# Patient Record
Sex: FEMALE | Race: WHITE | NOT HISPANIC OR LATINO | Employment: OTHER | URBAN - METROPOLITAN AREA
[De-identification: names, ages, dates, MRNs, and addresses within clinical notes are randomized per-mention and may not be internally consistent; named-entity substitution may affect disease eponyms.]

---

## 2023-03-06 ENCOUNTER — APPOINTMENT (OUTPATIENT)
Dept: LAB | Facility: LAB | Age: 78
End: 2023-03-06
Payer: MEDICARE

## 2023-03-06 LAB
ALANINE AMINOTRANSFERASE (SGPT) (U/L) IN SER/PLAS: 15 U/L (ref 7–45)
ALBUMIN (G/DL) IN SER/PLAS: 4.5 G/DL (ref 3.4–5)
ALKALINE PHOSPHATASE (U/L) IN SER/PLAS: 44 U/L (ref 33–136)
ASPARTATE AMINOTRANSFERASE (SGOT) (U/L) IN SER/PLAS: 16 U/L (ref 9–39)
BILIRUBIN DIRECT (MG/DL) IN SER/PLAS: 0.2 MG/DL (ref 0–0.3)
BILIRUBIN TOTAL (MG/DL) IN SER/PLAS: 0.7 MG/DL (ref 0–1.2)
CREATININE (MG/DL) IN SER/PLAS: 0.78 MG/DL (ref 0.5–1.05)
GFR FEMALE: 78 ML/MIN/1.73M2
PROTEIN TOTAL: 6.9 G/DL (ref 6.4–8.2)
UREA NITROGEN (MG/DL) IN SER/PLAS: 24 MG/DL (ref 6–23)

## 2023-03-15 LAB
ALANINE AMINOTRANSFERASE (SGPT) (U/L) IN SER/PLAS: 20 U/L (ref 7–45)
ALBUMIN (G/DL) IN SER/PLAS: 4.2 G/DL (ref 3.4–5)
ALKALINE PHOSPHATASE (U/L) IN SER/PLAS: 45 U/L (ref 33–136)
ASPARTATE AMINOTRANSFERASE (SGOT) (U/L) IN SER/PLAS: 18 U/L (ref 9–39)
BILIRUBIN DIRECT (MG/DL) IN SER/PLAS: 0.2 MG/DL (ref 0–0.3)
BILIRUBIN TOTAL (MG/DL) IN SER/PLAS: 1 MG/DL (ref 0–1.2)
CALCIDIOL (25 OH VITAMIN D3) (NG/ML) IN SER/PLAS: 52 NG/ML
CALCIUM (MG/DL) IN SER/PLAS: 9.7 MG/DL (ref 8.6–10.6)
CREATININE (MG/DL) IN SER/PLAS: 0.86 MG/DL (ref 0.5–1.05)
GFR FEMALE: 69 ML/MIN/1.73M2
PROTEIN TOTAL: 6.7 G/DL (ref 6.4–8.2)
UREA NITROGEN (MG/DL) IN SER/PLAS: 23 MG/DL (ref 6–23)

## 2023-03-18 LAB — OSTEOCALCIN: 9 NG/ML (ref 8–36)

## 2023-03-20 LAB
CREATININE, URINE - PER VOLUME: 179 MG/DL
NTX, URINE (NM BCE/MM CREATININE): 21

## 2023-05-24 LAB
ALANINE AMINOTRANSFERASE (SGPT) (U/L) IN SER/PLAS: 20 U/L (ref 7–45)
ALBUMIN (G/DL) IN SER/PLAS: 3.9 G/DL (ref 3.4–5)
ALKALINE PHOSPHATASE (U/L) IN SER/PLAS: 42 U/L (ref 33–136)
ANION GAP IN SER/PLAS: 13 MMOL/L (ref 10–20)
ASPARTATE AMINOTRANSFERASE (SGOT) (U/L) IN SER/PLAS: 22 U/L (ref 9–39)
BASOPHILS (10*3/UL) IN BLOOD BY AUTOMATED COUNT: 0.02 X10E9/L (ref 0–0.1)
BASOPHILS/100 LEUKOCYTES IN BLOOD BY AUTOMATED COUNT: 0.4 % (ref 0–2)
BETA-2-MICROGLOBULIN (MG/L) IN SERUM: 2 MG/L (ref 0.7–2.2)
BILIRUBIN TOTAL (MG/DL) IN SER/PLAS: 0.7 MG/DL (ref 0–1.2)
CALCIUM (MG/DL) IN SER/PLAS: 9.4 MG/DL (ref 8.6–10.6)
CARBON DIOXIDE, TOTAL (MMOL/L) IN SER/PLAS: 29 MMOL/L (ref 21–32)
CHLORIDE (MMOL/L) IN SER/PLAS: 104 MMOL/L (ref 98–107)
CREATININE (MG/DL) IN SER/PLAS: 0.94 MG/DL (ref 0.5–1.05)
EOSINOPHILS (10*3/UL) IN BLOOD BY AUTOMATED COUNT: 0.15 X10E9/L (ref 0–0.4)
EOSINOPHILS/100 LEUKOCYTES IN BLOOD BY AUTOMATED COUNT: 3 % (ref 0–6)
ERYTHROCYTE DISTRIBUTION WIDTH (RATIO) BY AUTOMATED COUNT: 12.7 % (ref 11.5–14.5)
ERYTHROCYTE MEAN CORPUSCULAR HEMOGLOBIN CONCENTRATION (G/DL) BY AUTOMATED: 30.8 G/DL (ref 32–36)
ERYTHROCYTE MEAN CORPUSCULAR VOLUME (FL) BY AUTOMATED COUNT: 98 FL (ref 80–100)
ERYTHROCYTES (10*6/UL) IN BLOOD BY AUTOMATED COUNT: 3.91 X10E12/L (ref 4–5.2)
GFR FEMALE: 62 ML/MIN/1.73M2
GLUCOSE (MG/DL) IN SER/PLAS: 164 MG/DL (ref 74–99)
HEMATOCRIT (%) IN BLOOD BY AUTOMATED COUNT: 38.3 % (ref 36–46)
HEMOGLOBIN (G/DL) IN BLOOD: 11.8 G/DL (ref 12–16)
IMMATURE GRANULOCYTES/100 LEUKOCYTES IN BLOOD BY AUTOMATED COUNT: 0.4 % (ref 0–0.9)
LEUKOCYTES (10*3/UL) IN BLOOD BY AUTOMATED COUNT: 4.9 X10E9/L (ref 4.4–11.3)
LYMPHOCYTES (10*3/UL) IN BLOOD BY AUTOMATED COUNT: 1.31 X10E9/L (ref 0.8–3)
LYMPHOCYTES/100 LEUKOCYTES IN BLOOD BY AUTOMATED COUNT: 26.6 % (ref 13–44)
MONOCYTES (10*3/UL) IN BLOOD BY AUTOMATED COUNT: 0.53 X10E9/L (ref 0.05–0.8)
MONOCYTES/100 LEUKOCYTES IN BLOOD BY AUTOMATED COUNT: 10.8 % (ref 2–10)
NEUTROPHILS (10*3/UL) IN BLOOD BY AUTOMATED COUNT: 2.9 X10E9/L (ref 1.6–5.5)
NEUTROPHILS/100 LEUKOCYTES IN BLOOD BY AUTOMATED COUNT: 58.8 % (ref 40–80)
NRBC (PER 100 WBCS) BY AUTOMATED COUNT: 0 /100 WBC (ref 0–0)
PLATELETS (10*3/UL) IN BLOOD AUTOMATED COUNT: 241 X10E9/L (ref 150–450)
POTASSIUM (MMOL/L) IN SER/PLAS: 4.6 MMOL/L (ref 3.5–5.3)
PROTEIN TOTAL: 6.6 G/DL (ref 6.4–8.2)
SEDIMENTATION RATE, ERYTHROCYTE: 18 MM/H (ref 0–30)
SODIUM (MMOL/L) IN SER/PLAS: 141 MMOL/L (ref 136–145)
URATE (MG/DL) IN SER/PLAS: 4.4 MG/DL (ref 2.3–6.7)
UREA NITROGEN (MG/DL) IN SER/PLAS: 28 MG/DL (ref 6–23)

## 2023-05-25 LAB — VISCOSITY SERUM: 1.72 CP (ref 1.5–1.8)

## 2023-05-31 LAB
ALBUMIN ELP: 3.9 G/DL (ref 3.4–5)
ALPHA 1: 0.3 G/DL (ref 0.2–0.6)
ALPHA 2: 0.7 G/DL (ref 0.4–1.1)
BETA: 0.8 G/DL (ref 0.5–1.2)
GAMMA GLOBULIN: 0.9 G/DL (ref 0.5–1.4)
M-PROTEIN 1: 0.2 G/DL
PATH REVIEW - SERUM IMMUNOFIXATION: NORMAL
PATH REVIEW-SERUM PROTEIN ELECTROPHORESIS: NORMAL
PROTEIN ELECTROPHORESIS INTERPRETATION: ABNORMAL
PROTEIN TOTAL: 6.6 G/DL (ref 6.4–8.2)
SERUM IMMUNOFIXATION INTERPRETATION: ABNORMAL

## 2023-07-18 LAB
CHOLESTEROL (MG/DL) IN SER/PLAS: 153 MG/DL (ref 0–199)
CHOLESTEROL IN HDL (MG/DL) IN SER/PLAS: 63.5 MG/DL
CHOLESTEROL/HDL RATIO: 2.4
ESTIMATED AVERAGE GLUCOSE FOR HBA1C: 126 MG/DL
FASTING GLUCOSE (MG/DL) IN SER/PLAS: 90 MG/DL (ref 74–99)
HEMOGLOBIN A1C/HEMOGLOBIN TOTAL IN BLOOD: 6 %
LDL: 78 MG/DL (ref 0–99)
THYROTROPIN (MIU/L) IN SER/PLAS BY DETECTION LIMIT <= 0.05 MIU/L: 1.95 MIU/L (ref 0.44–3.98)
TRIGLYCERIDE (MG/DL) IN SER/PLAS: 56 MG/DL (ref 0–149)
VLDL: 11 MG/DL (ref 0–40)

## 2023-08-28 PROBLEM — D48.5 NEOPLASM OF UNCERTAIN BEHAVIOR OF SKIN OF NECK: Status: ACTIVE | Noted: 2023-08-28

## 2023-08-28 PROBLEM — K64.4 EXTERNAL HEMORRHOIDS: Status: ACTIVE | Noted: 2023-08-28

## 2023-08-28 PROBLEM — D64.9 ANEMIA: Status: ACTIVE | Noted: 2023-08-28

## 2023-08-28 PROBLEM — G62.9 SMALL FIBER NEUROPATHY: Status: ACTIVE | Noted: 2023-08-28

## 2023-08-28 PROBLEM — M54.50 LOW BACK PAIN: Status: ACTIVE | Noted: 2023-08-28

## 2023-08-28 PROBLEM — M16.9 OSTEOARTHRITIS OF HIP: Status: ACTIVE | Noted: 2023-08-28

## 2023-08-28 PROBLEM — H25.10 NUCLEAR SENILE CATARACT: Status: ACTIVE | Noted: 2023-08-28

## 2023-08-28 PROBLEM — H90.3 BILATERAL SENSORINEURAL HEARING LOSS: Status: ACTIVE | Noted: 2023-08-28

## 2023-08-28 PROBLEM — L08.9 INFECTION, SKIN: Status: ACTIVE | Noted: 2023-08-28

## 2023-08-28 PROBLEM — R42 DIZZINESS: Status: ACTIVE | Noted: 2023-08-28

## 2023-08-28 PROBLEM — E55.9 VITAMIN D DEFICIENCY: Status: ACTIVE | Noted: 2023-08-28

## 2023-08-28 PROBLEM — M47.12 CERVICAL SPONDYLOSIS WITH MYELOPATHY: Status: ACTIVE | Noted: 2023-08-28

## 2023-08-28 PROBLEM — H93.291 ABNORMAL AUDITORY PERCEPTION OF RIGHT EAR: Status: ACTIVE | Noted: 2023-08-28

## 2023-08-28 PROBLEM — M47.816 DJD (DEGENERATIVE JOINT DISEASE), LUMBAR: Status: ACTIVE | Noted: 2023-08-28

## 2023-08-28 PROBLEM — R73.9 HYPERGLYCEMIA: Status: ACTIVE | Noted: 2023-08-28

## 2023-08-28 PROBLEM — R25.2 LEG CRAMPS: Status: ACTIVE | Noted: 2023-08-28

## 2023-08-28 PROBLEM — R53.83 FATIGUE: Status: ACTIVE | Noted: 2023-08-28

## 2023-08-28 PROBLEM — H61.23 BILATERAL IMPACTED CERUMEN: Status: ACTIVE | Noted: 2023-08-28

## 2023-08-28 PROBLEM — M47.12 CERVICAL ARTHRITIS WITH MYELOPATHY: Status: ACTIVE | Noted: 2023-08-28

## 2023-08-28 PROBLEM — G47.00 INSOMNIA: Status: ACTIVE | Noted: 2023-08-28

## 2023-08-28 PROBLEM — R13.10 DYSPHAGIA: Status: ACTIVE | Noted: 2023-08-28

## 2023-08-28 PROBLEM — Z78.9 KNOWN MEDICAL PROBLEMS: Status: ACTIVE | Noted: 2023-08-28

## 2023-08-28 PROBLEM — M81.0 OSTEOPOROSIS: Status: ACTIVE | Noted: 2023-08-28

## 2023-08-28 PROBLEM — R20.0 NUMBNESS OF FEET: Status: ACTIVE | Noted: 2023-08-28

## 2023-08-28 PROBLEM — K59.09 CHRONIC CONSTIPATION: Status: ACTIVE | Noted: 2023-08-28

## 2023-08-28 PROBLEM — H61.21 IMPACTED CERUMEN OF RIGHT EAR: Status: ACTIVE | Noted: 2023-08-28

## 2023-08-28 PROBLEM — H04.129 TEAR FILM INSUFFICIENCY: Status: ACTIVE | Noted: 2023-08-28

## 2023-08-28 PROBLEM — E78.5 DYSLIPIDEMIA: Status: ACTIVE | Noted: 2023-08-28

## 2023-08-28 PROBLEM — D47.2 IGM LAMBDA MONOCLONAL GAMMOPATHY: Status: ACTIVE | Noted: 2023-08-28

## 2023-08-28 PROBLEM — M54.2 CERVICAL PAIN: Status: ACTIVE | Noted: 2023-08-28

## 2023-08-28 RX ORDER — SIMVASTATIN 10 MG/1
1 TABLET, FILM COATED ORAL NIGHTLY
COMMUNITY
Start: 2019-07-03 | End: 2024-01-02

## 2023-08-28 RX ORDER — AMOXICILLIN AND CLAVULANATE POTASSIUM 875; 125 MG/1; MG/1
TABLET, FILM COATED ORAL
COMMUNITY
End: 2024-01-11 | Stop reason: ALTCHOICE

## 2023-08-28 RX ORDER — DIPHENHYDRAMINE HCL 25 MG
1 CAPSULE ORAL NIGHTLY
COMMUNITY
End: 2024-01-11 | Stop reason: ALTCHOICE

## 2023-08-28 RX ORDER — DENOSUMAB 60 MG/ML
60 INJECTION SUBCUTANEOUS
COMMUNITY

## 2023-08-28 RX ORDER — ASPIRIN 325 MG
1 TABLET, DELAYED RELEASE (ENTERIC COATED) ORAL
COMMUNITY
Start: 2019-07-17 | End: 2024-01-11 | Stop reason: SDUPTHER

## 2023-10-05 ENCOUNTER — OFFICE VISIT (OUTPATIENT)
Dept: ENDOCRINOLOGY | Facility: CLINIC | Age: 78
End: 2023-10-05
Payer: MEDICARE

## 2023-10-05 VITALS — WEIGHT: 127 LBS | BODY MASS INDEX: 19.89 KG/M2 | DIASTOLIC BLOOD PRESSURE: 70 MMHG | SYSTOLIC BLOOD PRESSURE: 116 MMHG

## 2023-10-05 DIAGNOSIS — M81.0 OSTEOPOROSIS, UNSPECIFIED OSTEOPOROSIS TYPE, UNSPECIFIED PATHOLOGICAL FRACTURE PRESENCE: ICD-10-CM

## 2023-10-05 DIAGNOSIS — E04.1 SOLITARY THYROID NODULE: Primary | ICD-10-CM

## 2023-10-05 DIAGNOSIS — R73.03 PREDIABETES: ICD-10-CM

## 2023-10-05 DIAGNOSIS — E55.9 VITAMIN D DEFICIENCY: ICD-10-CM

## 2023-10-05 PROCEDURE — 1159F MED LIST DOCD IN RCRD: CPT | Performed by: INTERNAL MEDICINE

## 2023-10-05 PROCEDURE — 99205 OFFICE O/P NEW HI 60 MIN: CPT | Performed by: INTERNAL MEDICINE

## 2023-10-05 NOTE — PROGRESS NOTES
Patient ID: Donna Cadet is a 78 y.o. female who presents for New Patient Visit (Endocrine consult. Referred by Jesse for her thyroid nodules.) and Thyroid Problem.  HPI  The patient is referred for evaluation of thyroid nodule.    This is a 78-year-old female who during her physical exam was felt to have a goiter.    She underwent thyroid ultrasound on July 28 which revealed a right lobe 4.3 x 1.1 x 1.5 cm, left lobe 3.8 x 1.0 x 1.2 cm and is PSMF that was 0.2 cm.    Within the right lobe there was a 1.2 cm solid mildly hypoechoic nodule described by radiology as compatible with a TI-RADS 4 nodule.    She has had normal thyroid function.    She does complain of chronic constipation fatigue and myalgias as well as some anxiety but no other symptoms of hyper or hypothyroidism.    She did have a sister have thyroid nodule removed 50 years ago.    She has never had head or neck irradiation and has had no obstructive symptoms.    She has a past history of prediabetes vitamin D deficiency osteoporosis hyperlipidemia C-spine surgery hyper parathyroidectomy in 2008.    Social history  retired non-smoker drinks alcohol on occasion.    Family history positive for diabetes in her father.    ROS  Comprehensive review of systems is negative.    Objective   Physical Exam  Height 5 foot 7 weight 127 BMI 19.9    Alert and oriented x3  In no distress  No focal neurologic deficits  No supraclavicular, or dorsal fat  No purple striae  Integument intact  Eyes normal  ENT normal. No adenopathy  Thyroid palpable and normal. No nodules  Chest clear to auscultation  Heart sounds are normal  Abdomen nontender. Bowel sounds normal. No organomegaly  Feet are okay  Reflexes normal with normal return    Assessment/Plan     1.  Solitary TI-RADS four 1.2 cm nodule  2.  Prediabetes  3.  Osteoporosis  4.  Vitamin D deficiency  5.  Post parathyroidectomy    We reviewed her blood and ultrasound.    We discussed that her thyroid  function is normal.    We discussed the nonspecificity of her thyroid symptoms.    We discussed American thyroid Association guidelines with regards to thyroid nodules.    Based on a TI-RADS 4 nodule greater than 1 cm but less than 1.5 cm advice is to follow with ultrasounds at 1, 2, 3 and 5 years.    We will set her up for an ultrasound to be done here in the office in 1 year.    She is advised to watch for changes within her thyroid and notify me sooner.    She will follow-up with me in 1 year sooner as needed.    I spent 60 minutes with this patient.  Greater than 50% of this time was spent in counseling and/or coordination of care.

## 2023-10-27 ENCOUNTER — TELEPHONE (OUTPATIENT)
Dept: PRIMARY CARE | Facility: CLINIC | Age: 78
End: 2023-10-27
Payer: MEDICARE

## 2023-10-30 DIAGNOSIS — G95.9 CERVICAL MYELOPATHY (MULTI): ICD-10-CM

## 2023-10-31 ENCOUNTER — TELEPHONE (OUTPATIENT)
Dept: PRIMARY CARE | Facility: CLINIC | Age: 78
End: 2023-10-31
Payer: MEDICARE

## 2023-12-01 ENCOUNTER — ANESTHESIA EVENT (OUTPATIENT)
Dept: RADIOLOGY | Facility: HOSPITAL | Age: 78
End: 2023-12-01
Payer: MEDICARE

## 2023-12-01 ENCOUNTER — ANESTHESIA (OUTPATIENT)
Dept: RADIOLOGY | Facility: HOSPITAL | Age: 78
End: 2023-12-01
Payer: MEDICARE

## 2023-12-01 ENCOUNTER — HOSPITAL ENCOUNTER (OUTPATIENT)
Dept: RADIOLOGY | Facility: HOSPITAL | Age: 78
Discharge: HOME | End: 2023-12-01
Payer: MEDICARE

## 2023-12-01 VITALS
OXYGEN SATURATION: 99 % | RESPIRATION RATE: 17 BRPM | TEMPERATURE: 96.8 F | SYSTOLIC BLOOD PRESSURE: 156 MMHG | DIASTOLIC BLOOD PRESSURE: 71 MMHG | HEART RATE: 56 BPM

## 2023-12-01 DIAGNOSIS — G95.9 CERVICAL MYELOPATHY (MULTI): ICD-10-CM

## 2023-12-01 PROCEDURE — A72141 CHG MRI, CERV SPINE

## 2023-12-01 PROCEDURE — 99100 ANES PT EXTEME AGE<1 YR&>70: CPT | Performed by: STUDENT IN AN ORGANIZED HEALTH CARE EDUCATION/TRAINING PROGRAM

## 2023-12-01 PROCEDURE — 2500000004 HC RX 250 GENERAL PHARMACY W/ HCPCS (ALT 636 FOR OP/ED)

## 2023-12-01 PROCEDURE — 72141 MRI NECK SPINE W/O DYE: CPT

## 2023-12-01 PROCEDURE — 2500000005 HC RX 250 GENERAL PHARMACY W/O HCPCS: Performed by: STUDENT IN AN ORGANIZED HEALTH CARE EDUCATION/TRAINING PROGRAM

## 2023-12-01 PROCEDURE — 3700000002 HC GENERAL ANESTHESIA TIME - EACH INCREMENTAL 1 MINUTE

## 2023-12-01 PROCEDURE — A72141 CHG MRI, CERV SPINE: Performed by: STUDENT IN AN ORGANIZED HEALTH CARE EDUCATION/TRAINING PROGRAM

## 2023-12-01 PROCEDURE — 72141 MRI NECK SPINE W/O DYE: CPT | Performed by: RADIOLOGY

## 2023-12-01 PROCEDURE — 3700000001 HC GENERAL ANESTHESIA TIME - INITIAL BASE CHARGE

## 2023-12-01 PROCEDURE — 2500000004 HC RX 250 GENERAL PHARMACY W/ HCPCS (ALT 636 FOR OP/ED): Performed by: STUDENT IN AN ORGANIZED HEALTH CARE EDUCATION/TRAINING PROGRAM

## 2023-12-01 RX ORDER — ONDANSETRON HYDROCHLORIDE 2 MG/ML
4 INJECTION, SOLUTION INTRAVENOUS ONCE AS NEEDED
OUTPATIENT
Start: 2023-12-01

## 2023-12-01 RX ORDER — HYDROMORPHONE HYDROCHLORIDE 1 MG/ML
0.2 INJECTION, SOLUTION INTRAMUSCULAR; INTRAVENOUS; SUBCUTANEOUS EVERY 5 MIN PRN
OUTPATIENT
Start: 2023-12-01

## 2023-12-01 RX ORDER — HYDROMORPHONE HYDROCHLORIDE 1 MG/ML
0.5 INJECTION, SOLUTION INTRAMUSCULAR; INTRAVENOUS; SUBCUTANEOUS EVERY 5 MIN PRN
OUTPATIENT
Start: 2023-12-01

## 2023-12-01 RX ORDER — ALBUTEROL SULFATE 0.83 MG/ML
2.5 SOLUTION RESPIRATORY (INHALATION) ONCE AS NEEDED
OUTPATIENT
Start: 2023-12-01

## 2023-12-01 RX ORDER — OXYCODONE HYDROCHLORIDE 10 MG/1
10 TABLET ORAL EVERY 4 HOURS PRN
OUTPATIENT
Start: 2023-12-01

## 2023-12-01 RX ORDER — FENTANYL CITRATE 50 UG/ML
INJECTION, SOLUTION INTRAMUSCULAR; INTRAVENOUS AS NEEDED
Status: DISCONTINUED | OUTPATIENT
Start: 2023-12-01 | End: 2023-12-01

## 2023-12-01 RX ORDER — MIDAZOLAM HYDROCHLORIDE 1 MG/ML
INJECTION, SOLUTION INTRAMUSCULAR; INTRAVENOUS AS NEEDED
Status: DISCONTINUED | OUTPATIENT
Start: 2023-12-01 | End: 2023-12-01

## 2023-12-01 RX ORDER — ACETAMINOPHEN 325 MG/1
650 TABLET ORAL EVERY 4 HOURS PRN
OUTPATIENT
Start: 2023-12-01

## 2023-12-01 RX ORDER — ROCURONIUM BROMIDE 10 MG/ML
INJECTION, SOLUTION INTRAVENOUS AS NEEDED
Status: DISCONTINUED | OUTPATIENT
Start: 2023-12-01 | End: 2023-12-01

## 2023-12-01 RX ORDER — OXYCODONE HYDROCHLORIDE 5 MG/1
5 TABLET ORAL EVERY 4 HOURS PRN
OUTPATIENT
Start: 2023-12-01

## 2023-12-01 RX ORDER — PROPOFOL 10 MG/ML
INJECTION, EMULSION INTRAVENOUS CONTINUOUS PRN
Status: DISCONTINUED | OUTPATIENT
Start: 2023-12-01 | End: 2023-12-01

## 2023-12-01 RX ORDER — PHENYLEPHRINE 10 MG/250 ML(40 MCG/ML)IN 0.9 % SOD.CHLORIDE INTRAVENOUS
CONTINUOUS PRN
Status: DISCONTINUED | OUTPATIENT
Start: 2023-12-01 | End: 2023-12-01

## 2023-12-01 RX ORDER — SODIUM CHLORIDE, SODIUM LACTATE, POTASSIUM CHLORIDE, CALCIUM CHLORIDE 600; 310; 30; 20 MG/100ML; MG/100ML; MG/100ML; MG/100ML
100 INJECTION, SOLUTION INTRAVENOUS CONTINUOUS
OUTPATIENT
Start: 2023-12-01

## 2023-12-01 RX ORDER — PROPOFOL 10 MG/ML
INJECTION, EMULSION INTRAVENOUS AS NEEDED
Status: DISCONTINUED | OUTPATIENT
Start: 2023-12-01 | End: 2023-12-01

## 2023-12-01 RX ORDER — LIDOCAINE HYDROCHLORIDE 20 MG/ML
INJECTION, SOLUTION INFILTRATION; PERINEURAL AS NEEDED
Status: DISCONTINUED | OUTPATIENT
Start: 2023-12-01 | End: 2023-12-01

## 2023-12-01 RX ORDER — PHENYLEPHRINE HCL IN 0.9% NACL 0.4MG/10ML
SYRINGE (ML) INTRAVENOUS AS NEEDED
Status: DISCONTINUED | OUTPATIENT
Start: 2023-12-01 | End: 2023-12-01

## 2023-12-01 RX ADMIN — ROCURONIUM BROMIDE 30 MG: 10 INJECTION, SOLUTION INTRAVENOUS at 11:10

## 2023-12-01 RX ADMIN — PROPOFOL 40 MG: 10 INJECTION, EMULSION INTRAVENOUS at 11:15

## 2023-12-01 RX ADMIN — PROPOFOL 100 MCG/KG/MIN: 10 INJECTION, EMULSION INTRAVENOUS at 11:27

## 2023-12-01 RX ADMIN — Medication 160 MCG: at 11:48

## 2023-12-01 RX ADMIN — SUGAMMADEX 200 MG: 100 INJECTION, SOLUTION INTRAVENOUS at 11:58

## 2023-12-01 RX ADMIN — MIDAZOLAM 2 MG: 1 INJECTION INTRAMUSCULAR; INTRAVENOUS at 11:05

## 2023-12-01 RX ADMIN — Medication 200 MCG: at 11:25

## 2023-12-01 RX ADMIN — PROPOFOL 20 MG: 10 INJECTION, EMULSION INTRAVENOUS at 11:20

## 2023-12-01 RX ADMIN — Medication 200 MCG: at 11:19

## 2023-12-01 RX ADMIN — LIDOCAINE HYDROCHLORIDE 100 MG: 20 INJECTION, SOLUTION INFILTRATION; PERINEURAL at 11:10

## 2023-12-01 RX ADMIN — PROPOFOL 20 MG: 10 INJECTION, EMULSION INTRAVENOUS at 11:25

## 2023-12-01 RX ADMIN — FENTANYL CITRATE 50 MCG: 50 INJECTION, SOLUTION INTRAMUSCULAR; INTRAVENOUS at 11:10

## 2023-12-01 RX ADMIN — SODIUM CHLORIDE, SODIUM LACTATE, POTASSIUM CHLORIDE, AND CALCIUM CHLORIDE: 600; 310; 30; 20 INJECTION, SOLUTION INTRAVENOUS at 11:04

## 2023-12-01 RX ADMIN — PROPOFOL 100 MG: 10 INJECTION, EMULSION INTRAVENOUS at 11:10

## 2023-12-01 SDOH — HEALTH STABILITY: MENTAL HEALTH: CURRENT SMOKER: 0

## 2023-12-01 ASSESSMENT — PAIN SCALES - GENERAL
PAINLEVEL_OUTOF10: 0 - NO PAIN
PAINLEVEL_OUTOF10: 0 - NO PAIN
PAIN_LEVEL: 0
PAINLEVEL_OUTOF10: 0 - NO PAIN

## 2023-12-01 ASSESSMENT — PAIN - FUNCTIONAL ASSESSMENT: PAIN_FUNCTIONAL_ASSESSMENT: 0-10

## 2023-12-01 NOTE — ANESTHESIA POSTPROCEDURE EVALUATION
Patient: Donna Cadet    Procedure Summary       Date: 12/01/23 Room / Location: [facility]    Anesthesia Start: 1100 Anesthesia Stop:     Procedure: MR CERVICAL SPINE WO CONTRAST Diagnosis:       Cervical myelopathy (CMS/HCC)      (Cervical Spondylosis with Myelopathy)    Scheduled Providers:  Responsible Provider: Martell Wyatt DO    Anesthesia Type: general ASA Status: 2            Anesthesia Type: general    Vitals Value Taken Time   /58 12/01/23 1144   Temp 36 12/01/23 1144   Pulse 66 12/01/23 1144   Resp 11 12/01/23 1144   SpO2 96 12/01/23 1144       Anesthesia Post Evaluation    Patient location during evaluation: PACU  Patient participation: complete - patient participated  Level of consciousness: awake  Pain score: 0  Pain management: adequate  Multimodal analgesia pain management approach  Airway patency: patent  Two or more strategies used to mitigate risk of obstructive sleep apnea  Cardiovascular status: acceptable  Respiratory status: face mask  Hydration status: acceptable  Postoperative Nausea and Vomiting: none      There were no known notable events for this encounter.

## 2023-12-01 NOTE — ANESTHESIA PROCEDURE NOTES
Airway  Date/Time: 12/1/2023 11:13 AM  Urgency: elective    Airway not difficult    Staffing  Performed: CRNA   Authorized by: Martell Wyatt DO    Performed by: JOSE MARIA Dang-SARI  Patient location during procedure: OR    Indications and Patient Condition  Indications for airway management: anesthesia  Spontaneous ventilation: present  Sedation level: no sedation  Preoxygenated: yes  Patient position: sniffing  MILS maintained throughout  Mask difficulty assessment: 1 - vent by mask    Final Airway Details  Final airway type: endotracheal airway      Successful airway: ETT  Cuffed: yes   Successful intubation technique: direct laryngoscopy  Blade: Opal  Blade size: #3  ETT size (mm): 7.0  Cormack-Lehane Classification: grade III - view of epiglottis only  Placement verified by: chest auscultation and capnometry   Measured from: lips  ETT to lips (cm): 21  Number of attempts at approach: 1

## 2023-12-01 NOTE — ANESTHESIA PREPROCEDURE EVALUATION
Patient: Donna Cadet    Procedure Information       Date/Time: 12/01/23 1000    Procedure: MR CERVICAL SPINE WO CONTRAST    Location: [facility]        There were no vitals filed for this visit.    Past Surgical History:   Procedure Laterality Date    COLONOSCOPY  04/20/2013    Complete Colonoscopy    LUMBAR FUSION  01/05/2017    Lumbar Vertebral Fusion    OTHER SURGICAL HISTORY  04/20/2013    Parathyroid Complete Parathyroidectomy    OTHER SURGICAL HISTORY  05/10/2018    Vertebral Body Resection Cervical Segment     Past Medical History:   Diagnosis Date    Age-related osteoporosis without current pathological fracture 07/10/2013    Osteoporosis    Other conditions influencing health status     Bone Density Studies Dual-Energy X-ray Absorptiometry    Other hemorrhoids 10/03/2017    Bleeding internal hemorrhoids    Other specified disorders of temporomandibular joint 08/03/2017    TMJ inflammation    Pyuria 03/25/2014    Pyuria       Current Outpatient Medications:     amoxicillin-pot clavulanate (Augmentin) 875-125 mg tablet, Take by mouth., Disp: , Rfl:     calcium carbonate (TUMS ORAL), Take by mouth., Disp: , Rfl:     CALCIUM ORAL, Calcium, Disp: , Rfl:     cholecalciferol (Vitamin D-3) 1,250 mcg (50,000 unit) capsule, Take 1 capsule (50,000 Units) by mouth. Once a month, Disp: , Rfl:     denosumab (Prolia) 60 mg/mL syringe, Inject 1 mL (60 mg) under the skin., Disp: , Rfl:     diphenhydrAMINE (BENADryl) 25 mg capsule, Take 1 capsule (25 mg) by mouth once daily at bedtime., Disp: , Rfl:     simvastatin (Zocor) 10 mg tablet, Take 1 tablet (10 mg) by mouth once daily at bedtime., Disp: , Rfl:   Prior to Admission medications    Medication Sig Start Date End Date Taking? Authorizing Provider   amoxicillin-pot clavulanate (Augmentin) 875-125 mg tablet Take by mouth.    Historical Provider, MD   calcium carbonate (TUMS ORAL) Take by mouth.    Historical Provider, MD   CALCIUM ORAL Calcium     "Historical Provider, MD   cholecalciferol (Vitamin D-3) 1,250 mcg (50,000 unit) capsule Take 1 capsule (50,000 Units) by mouth. Once a month 7/17/19   Historical Provider, MD   denosumab (Prolia) 60 mg/mL syringe Inject 1 mL (60 mg) under the skin.    Historical Provider, MD   diphenhydrAMINE (BENADryl) 25 mg capsule Take 1 capsule (25 mg) by mouth once daily at bedtime.    Historical Provider, MD   simvastatin (Zocor) 10 mg tablet Take 1 tablet (10 mg) by mouth once daily at bedtime. 7/3/19   Historical Provider, MD     Allergies   Allergen Reactions    Denosumab Unknown     Social History     Tobacco Use    Smoking status: Not on file    Smokeless tobacco: Not on file   Substance Use Topics    Alcohol use: Not on file         Chemistry    Lab Results   Component Value Date/Time     05/24/2023 0954    K 4.6 05/24/2023 0954     05/24/2023 0954    CO2 29 05/24/2023 0954    BUN 28 (H) 05/24/2023 0954    CREATININE 0.94 05/24/2023 0954    Lab Results   Component Value Date/Time    CALCIUM 9.4 05/24/2023 0954    ALKPHOS 42 05/24/2023 0954    AST 22 05/24/2023 0954    ALT 20 05/24/2023 0954    BILITOT 0.7 05/24/2023 0954          Lab Results   Component Value Date/Time    WBC 4.9 05/24/2023 0954    HGB 11.8 (L) 05/24/2023 0954    HCT 38.3 05/24/2023 0954     05/24/2023 0954     No results found for: \"PROTIME\", \"PTT\", \"INR\"  No results found for this or any previous visit (from the past 4464 hour(s)).  No results found for this or any previous visit from the past 1095 days.       Relevant Problems   Anesthesia (within normal limits)      Endocrine  S/p parathyroidectomy      Neuro/Psych   (+) Small fiber neuropathy      Hematology   (+) Anemia      Musculoskeletal   (+) Cervical spondylosis with myelopathy   (+) DJD (degenerative joint disease), lumbar   (+) Osteoarthritis of hip      Eyes, Ears, Nose, and Throat   (+) Bilateral sensorineural hearing loss      Infectious Disease   (+) Infection, skin    "   Other   (+) Cervical arthritis with myelopathy       Clinical information reviewed:                   NPO Detail:  No data recorded     Physical Exam    Airway  Mallampati: II  TM distance: >3 FB  Neck ROM: full     Cardiovascular   Rhythm: regular  Rate: normal  (+) murmur     Dental - normal exam  Comments: Cap/crown to #8   Pulmonary - normal exam     Abdominal - normal exam  Abdomen: soft             Anesthesia Plan    ASA 2     general     The patient is not a current smoker.    intravenous induction   Anesthetic plan and risks discussed with patient and spouse.  Use of blood products discussed with patient and spouse who.    Plan discussed with CRNA and attending.

## 2023-12-01 NOTE — ANESTHESIA PROCEDURE NOTES
Peripheral IV  Date/Time: 12/1/2023 11:56 AM  Inserted by: JOSE MARIA Dang-CRNA    Placement  Needle size: 20 G  Laterality: left  Location: arm  Local anesthetic: none  Site prep: alcohol  Technique: anatomical landmarks  Attempts: 1

## 2023-12-02 ENCOUNTER — DOCUMENTATION (OUTPATIENT)
Dept: NEUROLOGY | Facility: HOSPITAL | Age: 78
End: 2023-12-02
Payer: MEDICARE

## 2023-12-02 DIAGNOSIS — M47.12 CERVICAL SPONDYLOSIS WITH MYELOPATHY: Primary | ICD-10-CM

## 2023-12-06 NOTE — PROGRESS NOTES
I reviewed the MRI of the cervical spine done with sedation on Mrs. Donna Hernandez today and called her with the results.  The MRI revealed a stable C4-C7 fusion with a new disc bulging and moderate stenosis at C3-C4 right above the fusion.  There is no abnormal cord signal.  This was compared to prior MRI done preoperatively in 2017.    I called her with the results.  She said that she is stable and has mostly numbness in the legs.  She has had no weakness or imbalance.  She has had no symptoms in the upper limbs.  She denies any bladder or bowel control difficulties.    In summary, Mrs. DONNA HERNANDEZ has recurrent sensory symptoms in the legs. This is new since she had residual numbness related to severe cervical spondylotic myelopathy which was treated surgically with fusion in February 2017. She had improved significantly following surgery and her gait has normalized was last seen in 2019. Her neurological examination in August 2023 revealed hyperreflexia and significant loss of dorsal column functions particularly vibration in both legs.     She has recurrent cervical spondylotic myelopathy due to n a disc bulging/mild stenosis at C3-C4 above the fusion. T     I discussed this today with her and her  .  I suggested that she consult with Dr. Sincere Win who had operated on her initially.  I am not sure that she is at this time a candidate for another decompressive surgery.  However, she need to watch for any weakness, imbalance or sphincteric disturbance.  She should return and see me if she has any of the symptoms.  She will call for questions.    DIAGNOSIS    Cervical spondylosis with myelopathy - Primary       Lucian Choi M.D., F.A.C.P.   Director, [facility] laboratory   [facility]   [facility]   Professor of Neurology   [facility]

## 2023-12-31 DIAGNOSIS — E78.5 HYPERLIPIDEMIA, UNSPECIFIED: ICD-10-CM

## 2024-01-02 RX ORDER — SIMVASTATIN 10 MG/1
10 TABLET, FILM COATED ORAL NIGHTLY
Qty: 90 TABLET | Refills: 1 | Status: SHIPPED | OUTPATIENT
Start: 2024-01-02 | End: 2024-01-11 | Stop reason: SDUPTHER

## 2024-01-08 ENCOUNTER — OFFICE VISIT (OUTPATIENT)
Dept: ORTHOPEDIC SURGERY | Facility: CLINIC | Age: 79
End: 2024-01-08
Payer: MEDICARE

## 2024-01-08 DIAGNOSIS — R20.0 LEG NUMBNESS: Primary | ICD-10-CM

## 2024-01-08 PROCEDURE — 99203 OFFICE O/P NEW LOW 30 MIN: CPT | Performed by: ORTHOPAEDIC SURGERY

## 2024-01-08 PROCEDURE — 1126F AMNT PAIN NOTED NONE PRSNT: CPT | Performed by: ORTHOPAEDIC SURGERY

## 2024-01-08 NOTE — PROGRESS NOTES
Donna And her ,  Scott Cadet returned.  She had a prior multilevel anterior cervical decompression and fusion for severe spondylosis and myelopathy.    Over the last several months she has developed numbness in both calfs and feet.  It is intermittent in severity and duration.  No weakness.    No cervical symptoms of radiculopathy or coordination or weakness.    No treatment at this point.    She did see Dr. Choi who sent her off for a cervical MRI.    Family, social, and medical histories are obtained and reviewed.    30-point, patient-recorded Review of Systems is personally obtained and reviewed. Inclusive is no history of weight loss, change in appetite, recent change in activity level, change in bowel or bladder habits, fevers, chills, malaise, or night pain.    Healthy-appearing patient no acute distress.  Stable gait. Tandem ambulation without difficulty. Painless motion cervical spine. Negative Lhermitte's. Strength is intact both upper and lower extremities. Sensation intact. No hyporeflexia upper or lower extremities.    Her cervical MRI does show a mild disc bulge at C3-4, above her prior fusion.  There is mild canal stenosis but no severe spinal cord compression.    We reviewed her MRI and discussed her situation at length.  In the absence of any significant myelopathic issues, I would reassure her.  We talked about potential empiric treatment with medication but her symptoms are not as severe that she would like to consider this.    She will observe things and she will keep us updated on her progress.    ** Dictated with voice recognition software and not immediately reviewed for errors in grammar and/or spelling **

## 2024-01-08 NOTE — LETTER
January 8, 2024     Karla Justice MD  [facility], [address]  [address]   [place] [address]    Patient: Donna Cadet   YOB: 1945   Date of Visit: 1/8/2024       Dear Dr. Karla Justice MD:    Thank you for referring Donna Cadet to me for evaluation. Below are my notes for this consultation.  If you have questions, please do not hesitate to call me. I look forward to following your patient along with you.       Sincerely,     Sincere Win MD      CC: No Recipients  ______________________________________________________________________________________    Donna And her , Dr. Scott Mendezmarilia returned.  She had a prior multilevel anterior cervical decompression and fusion for severe spondylosis and myelopathy.    Over the last several months she has developed numbness in both calfs and feet.  It is intermittent in severity and duration.  No weakness.    No cervical symptoms of radiculopathy or coordination or weakness.    No treatment at this point.    She did see Dr. Choi who sent her off for a cervical MRI.    Family, social, and medical histories are obtained and reviewed.    30-point, patient-recorded Review of Systems is personally obtained and reviewed. Inclusive is no history of weight loss, change in appetite, recent change in activity level, change in bowel or bladder habits, fevers, chills, malaise, or night pain.    Healthy-appearing patient no acute distress.  Stable gait. Tandem ambulation without difficulty. Painless motion cervical spine. Negative Lhermitte's. Strength is intact both upper and lower extremities. Sensation intact. No hyporeflexia upper or lower extremities.    Her cervical MRI does show a mild disc bulge at C3-4, above her prior fusion.  There is mild canal stenosis but no severe spinal cord compression.    We reviewed her MRI and discussed her situation at length.  In the absence of any significant myelopathic  issues, I would reassure her.  We talked about potential empiric treatment with medication but her symptoms are not as severe that she would like to consider this.    She will observe things and she will keep us updated on her progress.    ** Dictated with voice recognition software and not immediately reviewed for errors in grammar and/or spelling **

## 2024-01-11 ENCOUNTER — OFFICE VISIT (OUTPATIENT)
Dept: PRIMARY CARE | Facility: CLINIC | Age: 79
End: 2024-01-11
Payer: MEDICARE

## 2024-01-11 VITALS
RESPIRATION RATE: 16 BRPM | WEIGHT: 126.6 LBS | BODY MASS INDEX: 19.87 KG/M2 | DIASTOLIC BLOOD PRESSURE: 60 MMHG | HEART RATE: 69 BPM | SYSTOLIC BLOOD PRESSURE: 94 MMHG | OXYGEN SATURATION: 98 % | HEIGHT: 67 IN | TEMPERATURE: 98.1 F

## 2024-01-11 DIAGNOSIS — E78.5 HYPERLIPIDEMIA, UNSPECIFIED: ICD-10-CM

## 2024-01-11 DIAGNOSIS — G47.00 INSOMNIA, UNSPECIFIED TYPE: Primary | ICD-10-CM

## 2024-01-11 DIAGNOSIS — E55.9 VITAMIN D DEFICIENCY: ICD-10-CM

## 2024-01-11 DIAGNOSIS — D47.2 IGM LAMBDA MONOCLONAL GAMMOPATHY: ICD-10-CM

## 2024-01-11 PROCEDURE — 1126F AMNT PAIN NOTED NONE PRSNT: CPT | Performed by: INTERNAL MEDICINE

## 2024-01-11 PROCEDURE — 99214 OFFICE O/P EST MOD 30 MIN: CPT | Performed by: INTERNAL MEDICINE

## 2024-01-11 RX ORDER — ASPIRIN 325 MG
TABLET, DELAYED RELEASE (ENTERIC COATED) ORAL
Qty: 12 CAPSULE | Refills: 1 | Status: SHIPPED | OUTPATIENT
Start: 2024-01-11

## 2024-01-11 RX ORDER — SIMVASTATIN 10 MG/1
10 TABLET, FILM COATED ORAL NIGHTLY
Qty: 90 TABLET | Refills: 1 | Status: SHIPPED | OUTPATIENT
Start: 2024-01-11 | End: 2024-01-22 | Stop reason: SDUPTHER

## 2024-01-11 ASSESSMENT — ENCOUNTER SYMPTOMS
CONSTIPATION: 0
MYALGIAS: 0
DEPRESSION: 0
LOSS OF SENSATION IN FEET: 0
OCCASIONAL FEELINGS OF UNSTEADINESS: 0
BACK PAIN: 0
SHORTNESS OF BREATH: 0

## 2024-01-11 ASSESSMENT — PATIENT HEALTH QUESTIONNAIRE - PHQ9
1. LITTLE INTEREST OR PLEASURE IN DOING THINGS: NOT AT ALL
2. FEELING DOWN, DEPRESSED OR HOPELESS: NOT AT ALL
SUM OF ALL RESPONSES TO PHQ9 QUESTIONS 1 AND 2: 0

## 2024-01-11 ASSESSMENT — COLUMBIA-SUICIDE SEVERITY RATING SCALE - C-SSRS
1. IN THE PAST MONTH, HAVE YOU WISHED YOU WERE DEAD OR WISHED YOU COULD GO TO SLEEP AND NOT WAKE UP?: NO
6. HAVE YOU EVER DONE ANYTHING, STARTED TO DO ANYTHING, OR PREPARED TO DO ANYTHING TO END YOUR LIFE?: NO
2. HAVE YOU ACTUALLY HAD ANY THOUGHTS OF KILLING YOURSELF?: NO

## 2024-01-11 NOTE — PROGRESS NOTES
"Subjective   Patient ID: Donna Cadet is a 78 y.o. female who presents for Hyperlipidemia.    HPI she continues walking 3 to 4  x week,indoors about 1.5 to miles /each.  She has good compliance with her meds  She continues Prolia every 6 m with Rheumatologist, last dexa 2 y ago.  She saw neurologist due to bilateral distal legs numbness, neurology completed MRI cervical that confirmed herniation on c3c4 , spinal canal .  Then consulted neurosurgeon Dr Win. No need for intervention or meds.  Review consult delio quinn about thryoid nodule, she will follow up annualy  Her hematology visit will be in summer   Her sleep is hard to conceive or and maintain sleep, melatonine was not effective, takes benadryl in the middle in the night about 3 x week, feels fresh next day.    Review of Systems   Respiratory:  Negative for shortness of breath.    Cardiovascular:  Negative for chest pain.   Gastrointestinal:  Negative for constipation.        Constipation with straining some times, uses prunes, miralax but only every other week.   Musculoskeletal:  Negative for back pain and myalgias.       Objective   BP 94/60 (BP Location: Left arm, Patient Position: Sitting, BP Cuff Size: Adult)   Pulse 69   Temp 36.7 °C (98.1 °F)   Resp 16   Ht 1.702 m (5' 7\")   Wt 57.4 kg (126 lb 9.6 oz)   SpO2 98%   BMI 19.83 kg/m²     Physical Exam  Eyes - conjunctivae clear, PERRLA  HEENT - no impacted wax  Neck - no cervical lymphadenopathy,  thyromegaly  Axilla - no palpable lymphadenopathy  Cardiac- regular rate and rhythm, no murmurs, no carotid bruit, no JVP  Lung - clear to auscultation, no rales, no rhonchi, no wheezing  GI - normally active bowel sounds, non tender, non distended, no hepatosplenomegaly, no rebound  MSK - non deformities, no lateral hip pain  Extremities - no edema, good distal pulses  Neuro - non focal, oriented x 3  Skin - no bruises, no rashes  Psychiatric - pleasant, well groom, no hallucinations    Assessment/Plan "   Problem List Items Addressed This Visit             ICD-10-CM       Cardiac and Vasculature    Hyperlipidemia, unspecified E78.5     On goal, continue same dose         Relevant Medications    simvastatin (Zocor) 10 mg tablet       Endocrine/Metabolic    Vitamin D deficiency E55.9    Relevant Medications    cholecalciferol (Vitamin D-3) 50,000 unit capsule       Hematology and Neoplasia    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            Sleep    Insomnia - Primary G47.00     Prefers to avoid rx, she will continue prn otc

## 2024-01-11 NOTE — PATIENT INSTRUCTIONS
Please increase weight bearing exercise, continue current medications, increase frequency of miralax, maintain hydration,

## 2024-01-19 ENCOUNTER — APPOINTMENT (OUTPATIENT)
Dept: OPHTHALMOLOGY | Facility: CLINIC | Age: 79
End: 2024-01-19
Payer: MEDICARE

## 2024-01-22 DIAGNOSIS — E78.5 HYPERLIPIDEMIA, UNSPECIFIED: ICD-10-CM

## 2024-01-26 RX ORDER — SIMVASTATIN 10 MG/1
10 TABLET, FILM COATED ORAL NIGHTLY
Qty: 14 TABLET | Refills: 0 | Status: SHIPPED | OUTPATIENT
Start: 2024-01-26

## 2024-01-30 DIAGNOSIS — E78.5 HYPERLIPIDEMIA, UNSPECIFIED: ICD-10-CM

## 2024-03-07 ENCOUNTER — OFFICE VISIT (OUTPATIENT)
Dept: OPHTHALMOLOGY | Facility: CLINIC | Age: 79
End: 2024-03-07
Payer: MEDICARE

## 2024-03-07 DIAGNOSIS — H52.7 UNSPECIFIED DISORDER OF REFRACTION: ICD-10-CM

## 2024-03-07 DIAGNOSIS — H25.13 AGE-RELATED NUCLEAR CATARACT OF BOTH EYES: Primary | ICD-10-CM

## 2024-03-07 DIAGNOSIS — H04.123 INSUFFICIENCY OF TEAR FILM OF BOTH EYES: ICD-10-CM

## 2024-03-07 PROCEDURE — 99213 OFFICE O/P EST LOW 20 MIN: CPT | Performed by: OPHTHALMOLOGY

## 2024-03-07 PROCEDURE — 92015 DETERMINE REFRACTIVE STATE: CPT | Performed by: OPHTHALMOLOGY

## 2024-03-07 PROCEDURE — 92015 DETERMINE REFRACTIVE STATE: CPT | Mod: MUE | Performed by: OPHTHALMOLOGY

## 2024-03-07 RX ORDER — POLYETHYLENE GLYCOL 3350 17 G/17G
17 POWDER, FOR SOLUTION ORAL DAILY
COMMUNITY
Start: 2019-07-17

## 2024-03-07 ASSESSMENT — REFRACTION_WEARINGRX
OS_SPHERE: +1.25
OD_ADD: +2.75
OS_ADD: +2.75
SPECS_TYPE: BIFOCAL
OS_AXIS: 090
OS_CYLINDER: -1.00
OS_CYLINDER: -0.75
OD_AXIS: 070
OD_SPHERE: +2.50
OD_AXIS: 082
OD_CYLINDER: -0.75
OD_AXIS: 084
OS_CYLINDER: -1.00
OS_AXIS: 087
OD_CYLINDER: -0.75
OS_SPHERE: +1.75
OD_SPHERE: +1.25
OS_SPHERE: +2.50
OD_SPHERE: +2.00
OD_SPHERE: +1.75
OD_CYLINDER: -0.75
SPECS_TYPE: OTC READERS
OS_AXIS: 090
OS_SPHERE: +1.75
SPECS_TYPE: DISTANCE ONLY

## 2024-03-07 ASSESSMENT — TONOMETRY
OS_IOP_MMHG: 15
OD_IOP_MMHG: 15
IOP_METHOD: GOLDMANN APPLANATION

## 2024-03-07 ASSESSMENT — KERATOMETRY
METHOD_AUTO_MANUAL: AUTOMATED
OS_K1POWER_DIOPTERS: 46.25
OS_AXISANGLE2_DEGREES: 110
OS_K2POWER_DIOPTERS: 47.00
OD_AXISANGLE_DEGREES: 50
OD_K1POWER_DIOPTERS: 45.75
OD_K2POWER_DIOPTERS: 46.00
OD_AXISANGLE2_DEGREES: 140
OS_AXISANGLE_DEGREES: 20

## 2024-03-07 ASSESSMENT — EXTERNAL EXAM - RIGHT EYE: OD_EXAM: NORMAL

## 2024-03-07 ASSESSMENT — SLIT LAMP EXAM - LIDS
COMMENTS: NORMAL
COMMENTS: NORMAL

## 2024-03-07 ASSESSMENT — VISUAL ACUITY
OS_CC+: -1
CORRECTION_TYPE: GLASSES
OD_CC: 20/25
OS_CC: 20/25
METHOD: SNELLEN - SINGLE

## 2024-03-07 ASSESSMENT — CUP TO DISC RATIO
OD_RATIO: 0.1
OS_RATIO: 0.1

## 2024-03-07 ASSESSMENT — ENCOUNTER SYMPTOMS
ENDOCRINE NEGATIVE: 0
LOSS OF SENSATION IN FEET: 0
OCCASIONAL FEELINGS OF UNSTEADINESS: 0
HEMATOLOGIC/LYMPHATIC NEGATIVE: 0
PSYCHIATRIC NEGATIVE: 0
CONSTITUTIONAL NEGATIVE: 0
ALLERGIC/IMMUNOLOGIC NEGATIVE: 0
MUSCULOSKELETAL NEGATIVE: 0
CARDIOVASCULAR NEGATIVE: 0
GASTROINTESTINAL NEGATIVE: 0
RESPIRATORY NEGATIVE: 0
DEPRESSION: 0
NEUROLOGICAL NEGATIVE: 0
EYES NEGATIVE: 0

## 2024-03-07 ASSESSMENT — EXTERNAL EXAM - LEFT EYE: OS_EXAM: NORMAL

## 2024-03-07 ASSESSMENT — REFRACTION_MANIFEST
OD_AXIS: 095
OS_AXIS: 100
OS_SPHERE: +2.25
OD_SPHERE: +2.00
OS_CYLINDER: -1.00
OD_CYLINDER: -0.75
METHOD_AUTOREFRACTION: 1

## 2024-03-07 ASSESSMENT — PAIN SCALES - GENERAL: PAINLEVEL: 0-NO PAIN

## 2024-03-07 ASSESSMENT — PATIENT HEALTH QUESTIONNAIRE - PHQ9
SUM OF ALL RESPONSES TO PHQ9 QUESTIONS 1 AND 2: 0
1. LITTLE INTEREST OR PLEASURE IN DOING THINGS: NOT AT ALL
2. FEELING DOWN, DEPRESSED OR HOPELESS: NOT AT ALL

## 2024-03-07 NOTE — ASSESSMENT & PLAN NOTE
Looks dry on exam and some symptoms as well. Advised on regular lubrication and would expect could sharpen near issues with vision.

## 2024-03-07 NOTE — PROGRESS NOTES
Assessment/Plan   Problem List Items Addressed This Visit       Tear film insufficiency     Looks dry on exam and some symptoms as well. Advised on regular lubrication and would expect could sharpen near issues with vision.          Age-related nuclear cataract of both eyes - Primary     Non significant cataract noted on exam. Will plan to continue to monitor with serial exam.            Unspecified disorder of refraction     Discussed glasses prescription from refraction. Will provide if patient interested in keeping for records or to fill as a new set of glasses.               Provided reassurance regarding above diagnoses and care received in the office visit today. Discussed outcomes and options along with the importance of treatment compliance. Understands the importance of any follow up visits. Patient instructed to call/communicate with our office if any new issues, questions, or concerns.     Will plan to see back in 1 year full or sooner PRN

## 2024-03-07 NOTE — PATIENT INSTRUCTIONS
Thank you so much for choosing me to provide your care today!    If you were dilated your vision may remain blurry   or light sensitive for several hours.    The nature of eye and vision problems can require frequent follow up, please make every effort to adhere to any future appointments.    If you have any issues, questions, or concerns,   please do not hesitate to reach out.    If you receive a survey in regards to your care today, please mention any exceptional care my office staff and/or technicians provided.    You can reach our office at this number:  [phone]

## 2024-03-14 ENCOUNTER — TELEPHONE (OUTPATIENT)
Dept: RHEUMATOLOGY | Facility: CLINIC | Age: 79
End: 2024-03-14
Payer: MEDICARE

## 2024-03-14 DIAGNOSIS — M81.0 OSTEOPOROSIS WITHOUT CURRENT PATHOLOGICAL FRACTURE, UNSPECIFIED OSTEOPOROSIS TYPE: ICD-10-CM

## 2024-04-02 ENCOUNTER — TRANSCRIBE ORDERS (OUTPATIENT)
Dept: RHEUMATOLOGY | Facility: CLINIC | Age: 79
End: 2024-04-02
Payer: MEDICARE

## 2024-04-02 DIAGNOSIS — E55.9 VITAMIN D DEFICIENCY: Primary | ICD-10-CM

## 2024-04-02 DIAGNOSIS — M81.0 OSTEOPOROSIS, UNSPECIFIED OSTEOPOROSIS TYPE, UNSPECIFIED PATHOLOGICAL FRACTURE PRESENCE: ICD-10-CM

## 2024-04-12 ENCOUNTER — LAB (OUTPATIENT)
Dept: LAB | Facility: LAB | Age: 79
End: 2024-04-12
Payer: MEDICARE

## 2024-04-12 DIAGNOSIS — M81.0 OSTEOPOROSIS, UNSPECIFIED OSTEOPOROSIS TYPE, UNSPECIFIED PATHOLOGICAL FRACTURE PRESENCE: ICD-10-CM

## 2024-04-12 DIAGNOSIS — E55.9 VITAMIN D DEFICIENCY: ICD-10-CM

## 2024-04-12 LAB
25(OH)D3 SERPL-MCNC: 48 NG/ML (ref 30–100)
ALBUMIN SERPL BCP-MCNC: 4 G/DL (ref 3.4–5)
ALP SERPL-CCNC: 44 U/L (ref 33–136)
ALT SERPL W P-5'-P-CCNC: 17 U/L (ref 7–45)
ANION GAP SERPL CALC-SCNC: 11 MMOL/L (ref 10–20)
AST SERPL W P-5'-P-CCNC: 17 U/L (ref 9–39)
BILIRUB SERPL-MCNC: 0.6 MG/DL (ref 0–1.2)
BUN SERPL-MCNC: 24 MG/DL (ref 6–23)
CALCIUM SERPL-MCNC: 9.5 MG/DL (ref 8.6–10.6)
CHLORIDE SERPL-SCNC: 105 MMOL/L (ref 98–107)
CO2 SERPL-SCNC: 31 MMOL/L (ref 21–32)
CREAT SERPL-MCNC: 0.97 MG/DL (ref 0.5–1.05)
EGFRCR SERPLBLD CKD-EPI 2021: 60 ML/MIN/1.73M*2
GLUCOSE SERPL-MCNC: 100 MG/DL (ref 74–99)
MAGNESIUM SERPL-MCNC: 1.7 MG/DL (ref 1.6–2.4)
PHOSPHATE SERPL-MCNC: 4.1 MG/DL (ref 2.5–4.9)
POTASSIUM SERPL-SCNC: 4.3 MMOL/L (ref 3.5–5.3)
PROT SERPL-MCNC: 6.4 G/DL (ref 6.4–8.2)
PTH-INTACT SERPL-MCNC: 77.8 PG/ML (ref 18.5–88)
SODIUM SERPL-SCNC: 143 MMOL/L (ref 136–145)

## 2024-04-12 PROCEDURE — 80053 COMPREHEN METABOLIC PANEL: CPT

## 2024-04-12 PROCEDURE — 36415 COLL VENOUS BLD VENIPUNCTURE: CPT

## 2024-04-12 PROCEDURE — 82306 VITAMIN D 25 HYDROXY: CPT

## 2024-04-12 PROCEDURE — 83735 ASSAY OF MAGNESIUM: CPT

## 2024-04-12 PROCEDURE — 83970 ASSAY OF PARATHORMONE: CPT

## 2024-04-12 PROCEDURE — 84100 ASSAY OF PHOSPHORUS: CPT

## 2024-05-02 ENCOUNTER — OFFICE VISIT (OUTPATIENT)
Dept: RHEUMATOLOGY | Facility: CLINIC | Age: 79
End: 2024-05-02
Payer: MEDICARE

## 2024-05-02 VITALS
DIASTOLIC BLOOD PRESSURE: 84 MMHG | OXYGEN SATURATION: 99 % | SYSTOLIC BLOOD PRESSURE: 122 MMHG | BODY MASS INDEX: 19.83 KG/M2 | HEART RATE: 64 BPM | HEIGHT: 67 IN | WEIGHT: 126.32 LBS

## 2024-05-02 DIAGNOSIS — M35.00 SICCA, UNSPECIFIED TYPE (MULTI): ICD-10-CM

## 2024-05-02 DIAGNOSIS — M81.0 OSTEOPOROSIS, UNSPECIFIED OSTEOPOROSIS TYPE, UNSPECIFIED PATHOLOGICAL FRACTURE PRESENCE: Primary | ICD-10-CM

## 2024-05-02 DIAGNOSIS — Z79.899 ENCOUNTER FOR LONG-TERM (CURRENT) USE OF MEDICATIONS: ICD-10-CM

## 2024-05-02 DIAGNOSIS — E55.9 VITAMIN D DEFICIENCY: ICD-10-CM

## 2024-05-02 DIAGNOSIS — D47.2 MGUS (MONOCLONAL GAMMOPATHY OF UNKNOWN SIGNIFICANCE): ICD-10-CM

## 2024-05-02 PROCEDURE — 99213 OFFICE O/P EST LOW 20 MIN: CPT | Performed by: INTERNAL MEDICINE

## 2024-05-02 PROCEDURE — 96372 THER/PROPH/DIAG INJ SC/IM: CPT | Performed by: INTERNAL MEDICINE

## 2024-05-02 PROCEDURE — 1036F TOBACCO NON-USER: CPT | Performed by: INTERNAL MEDICINE

## 2024-05-02 PROCEDURE — 1159F MED LIST DOCD IN RCRD: CPT | Performed by: INTERNAL MEDICINE

## 2024-05-02 PROCEDURE — 2500000004 HC RX 250 GENERAL PHARMACY W/ HCPCS (ALT 636 FOR OP/ED): Mod: JZ | Performed by: INTERNAL MEDICINE

## 2024-05-02 RX ADMIN — DENOSUMAB 60 MG: 60 INJECTION SUBCUTANEOUS at 11:55

## 2024-05-02 ASSESSMENT — ROUTINE ASSESSMENT OF PATIENT INDEX DATA (RAPID3)
WEIGHTED_TOTAL_SCORE: 0
SEVERITY_SCORE: NEAR REMISSION (NR)
ON A SCALE OF ONE TO TEN, HOW MUCH PAIN HAVE YOU HAD BECAUSE OF YOUR CONDITION OVER THE PAST WEEK?: 0
PARTIPATE_RECREATIONAL_ACTIVITIES: WITHOUT ANY DIFFICULTY
TURN_FAUCETS_OFF: WITHOUT ANY DIFFICULTY
LIFT_CUP_TO_MOUTH: WITHOUT ANY DIFFICULTY
TOTAL RAPID3 SCORE: 0
SEVERITY_SCORE: 0
IN_OUT_TRANSPORT: WITHOUT ANY DIFFICULTY
PICK_CLOTHES_OFF_FLOOR: WITHOUT ANY DIFFICULTY
WASH_DRY_BODY: WITHOUT ANY DIFFICULTY
FEELINGS_ANXIETY_NERVOUS: WITHOUT ANY DIFFICULTY
FN_SCORE: 0
ON A SCALE OF ONE TO TEN, CONSIDERING ALL THE WAYS IN WHICH ILLNESS AND HEALTH CONDITIONS MAY AFFECT YOU AT THIS TIME, PLEASE INDICATE BELOW HOW YOU ARE DOING:: 0
ON A SCALE OF ONE TO TEN, HOW MUCH PAIN HAVE YOU HAD BECAUSE OF YOUR CONDITION OVER THE PAST WEEK?: 0
DRESS_YOURSELF: WITHOUT ANY DIFFICULTY
WALK_FLAT_GROUND: WITHOUT ANY DIFFICULTY
FEELINGS_DEPRESSION: WITHOUT ANY DIFFICULTY
GOOD_NIGHTS_SLEEP: WITHOUT ANY DIFFICULTY
SUM OF QUESTIONS A TO J: 0
WALK_KILOMETERS: WITHOUT ANY DIFFICULTY
IN_OUT_BED: WITHOUT ANY DIFFICULTY
ON A SCALE OF ONE TO TEN, CONSIDERING ALL THE WAYS IN WHICH ILLNESS AND HEALTH CONDITIONS MAY AFFECT YOU AT THIS TIME, PLEASE INDICATE BELOW HOW YOU ARE DOING:: 0

## 2024-05-02 ASSESSMENT — PATIENT HEALTH QUESTIONNAIRE - PHQ9
1. LITTLE INTEREST OR PLEASURE IN DOING THINGS: NOT AT ALL
SUM OF ALL RESPONSES TO PHQ9 QUESTIONS 1 AND 2: 0
2. FEELING DOWN, DEPRESSED OR HOPELESS: NOT AT ALL

## 2024-05-02 ASSESSMENT — ENCOUNTER SYMPTOMS
LOSS OF SENSATION IN FEET: 1
OCCASIONAL FEELINGS OF UNSTEADINESS: 0
DEPRESSION: 0

## 2024-05-02 ASSESSMENT — PAIN SCALES - GENERAL: PAINLEVEL_OUTOF10: 0 - NO PAIN

## 2024-05-02 ASSESSMENT — PAIN - FUNCTIONAL ASSESSMENT: PAIN_FUNCTIONAL_ASSESSMENT: 0-10

## 2024-05-02 NOTE — PROGRESS NOTES
[facility]/  Rheumatology  [address] [place] [address], [address]  [place] [address]  Phone: [phone]  Fax: [phone]    Rheumatology Initial Visit 6/1/24    Referred by: Dr García for   Chief Complaint   Patient presents with    New Patient Visit       Donna Cadet is a 78 y.o. female here for continuing osteoporosis tx with Prolia.       HPI  77 y/o female with hx of   Hyperlipidemia, prediabetes, osteoporosis without know fragility fractures, MGUS, C-spine surgery, lumbar fusion, hyperPTH, s/p PTH gland resection 2008, thyroid nodules.  Previous pt of Dr García for osteoporosis.  Has done well with Prolia and is due her injection  and will be getting today.   Denies any jaw, joint or bone pain, fractures or falls.   On calcium and vit D    ROS + dry eyes- also noted by Optha and advised to use lubricating eye drops, non significant sandhya cataracts    Rheum Hx  Sept- last Prolia      Health Maintenance:  DXA T-3.1 (hip; 1/22); FRAX 18%/6.9%  Malignancy Hx- none  Immunization History   Administered Date(s) Administered    Flu vaccine, quadrivalent, high-dose, preservative free, age 65y+ (FLUZONE) 10/26/2020, 10/19/2022    Influenza, High Dose Seasonal, Preservative Free 11/29/2017, 10/16/2018, 10/01/2019, 09/25/2020    Influenza, Unspecified 09/22/2009, 09/02/2011, 10/31/2021    Influenza, seasonal, injectable 12/09/2016    Moderna SARS-CoV-2 Vaccination 01/25/2021, 02/22/2021, 11/06/2021    Pfizer COVID-19 vaccine, bivalent, age 12 years and older (30 mcg/0.3 mL) 12/09/2022    Pneumococcal conjugate vaccine, 13-valent (PREVNAR 13) 10/13/2015    RESPIRATORY SYNCYTIAL VIRUS (RSV), ELIGIBLE PREGNANT PTS, 0.5 ML (ABRYSVO) 12/04/2023    Tdap vaccine, age 7 year and older (BOOSTRIX, ADACEL) 09/02/2011, 07/28/2021    Zoster vaccine, recombinant, adult (SHINGRIX) 10/01/2020    Zoster, Unspecified 10/01/2020          Past Medical History:   Diagnosis Date    Age-related nuclear cataract of  "both eyes     Age-related osteoporosis without current pathological fracture 07/10/2013    Osteoporosis    Dry eye syndrome of bilateral lacrimal glands     History of hyperparathyroidism     s/p resection    MGUS (monoclonal gammopathy of unknown significance)     Multiple thyroid nodules     Other conditions influencing health status     Bone Density Studies Dual-Energy X-ray Absorptiometry    Other hemorrhoids 10/03/2017    Bleeding internal hemorrhoids    Other specified disorders of temporomandibular joint 08/03/2017    TMJ inflammation    Pyuria 03/25/2014    Pyuria    Unspecified disorder of refraction       Past Surgical History:   Procedure Laterality Date    COLONOSCOPY  04/20/2013    Complete Colonoscopy    LUMBAR FUSION  01/05/2017    Lumbar Vertebral Fusion    OTHER SURGICAL HISTORY  04/20/2013    Parathyroid Complete Parathyroidectomy    OTHER SURGICAL HISTORY  05/10/2018    Vertebral Body Resection Cervical Segment      Current Outpatient Medications   Medication Sig Dispense Refill    calcium carbonate (TUMS ORAL) Take by mouth.      cholecalciferol (Vitamin D-3) 50,000 unit capsule Once a month 12 capsule 1    denosumab (Prolia) 60 mg/mL syringe Inject 1 mL (60 mg) under the skin.      polyethylene glycol (Miralax) 17 gram/dose powder Take 17 g by mouth once daily.      simvastatin (Zocor) 10 mg tablet Take 1 tablet (10 mg) by mouth once daily at bedtime. 14 tablet 0     Current Facility-Administered Medications   Medication Dose Route Frequency Provider Last Rate Last Admin    denosumab (Prolia) injection 60 mg  60 mg subcutaneous q6 months Alma Delia Mike MD          No Known Allergies     Vitals:    05/02/24 1100   BP: 122/84   BP Location: Right arm   Patient Position: Sitting   Pulse: 64   SpO2: 99%   Weight: 57.3 kg (126 lb 5.2 oz)   Height: 1.702 m (5' 7\")           ZAMORA-28 (CRP): --    Physical Exam  Alert, attentive, no acute distress  Anicteric, non injected sclerae, external ears " and nose normal  Neck supple with no apparent tender/enlarged glands or nodes  Able to barbie a deep breath and talk without dyspnea  No spine tenderness or kyphosis  No isaak synovitis, good ROM throughout  CN2-12 grossly intact, adequate muscle tone and bulk for age and gender  Oriented x3  Able to get up from sitting without difficulty  Good gait without assistive devices  Component      Latest Ref Rng 4/12/2024   GLUCOSE      74 - 99 mg/dL 100 (H)    SODIUM      136 - 145 mmol/L 143    POTASSIUM      3.5 - 5.3 mmol/L 4.3    CHLORIDE      98 - 107 mmol/L 105    Bicarbonate      21 - 32 mmol/L 31    Anion Gap      10 - 20 mmol/L 11    Blood Urea Nitrogen      6 - 23 mg/dL 24 (H)    Creatinine      0.50 - 1.05 mg/dL 0.97    EGFR      >60 mL/min/1.73m*2 60 (L)    Calcium      8.6 - 10.6 mg/dL 9.5    Albumin      3.4 - 5.0 g/dL 4.0    Alkaline Phosphatase      33 - 136 U/L 44    Total Protein      6.4 - 8.2 g/dL 6.4    AST      9 - 39 U/L 17    Bilirubin Total      0.0 - 1.2 mg/dL 0.6    ALT      7 - 45 U/L 17    MAGNESIUM      1.60 - 2.40 mg/dL 1.70    PHOSPHORUS      2.5 - 4.9 mg/dL 4.1    Parathyroid Hormone, Intact      18.5 - 88.0 pg/mL 77.8    Vitamin D, 25-Hydroxy, Total      30 - 100 ng/mL 48       PROCEDURE:         DEXA BONE DENSITY STUDY - WXR  0269  REASON FOR EXAM: M81.0 AGE-RELATED OSTEOPOROSIS W/O CURRENT PATHOLOGICAL  FRACTURE     RESULT: CLINICAL HISTORY: Postmenopausal osteoporosis     Dual energy x-ray bone densitometry of the lumbar spine in PA projection and  both hips and left forearm was performed.     Please see attached data sheet which will be faxed with the final report.     FINDINGS:     LOCATION                    T- SCORE     Lumbar                      -0.7  LEFT Total Hip                -2.7  LEFT Hip (neck)             -3.1  RIGHT Total Hip             -2.3  RIGHT Hip (neck)           -2.9  LEFT Forearm                -3.0        10 year fracture risk as assessed by the FRAX WHO  "fracture risk assessment  tool:    1. Risk of major osteoporotic fracture is 18% %.    2. Risk of hip fracture is 6.9%%.  (FRAX Version 3.05. Fracture probability calculated for an untreated  patient.  Fracture probability may be lower if the patient has received  treatment)     IMPRESSION:     Lumbar spine evaluation demonstrates normal mineralization. No prior study  for comparison.     The overall LEFT hip evaluation demonstrates significant osteoporosis.  Density measurements decreased by  6% compared to study of 7/12/2017.     The overall RIGHT hip evaluation demonstrates severe osteopenia with focal  significant osteoporosis of the femoral neck region. No prior study for  comparison.     The LEFT forearm evaluation demonstrates significant osteoporosis. Density  measurements decreased by 6% compared to prior study.                 \"The T score compares the patient's BMD to peak bone mass of young healthy  adults.  According to World Health Organization, patients with T scores  between -1 and -2.5 are considered osteopenic, T-scores -2.5 or below  indicate osteoporosis (significant bone loss).\"        [id number]     This report has been produced using speech recognition.     Original Interpreting Physician:   PATIRCK THOMPSON M.D.  Original Transcribed by/Date: [facility]   Jan 12 2022  1:46P  Original Electronically Signed by/Date: PATRICK THOMPSON M.D. Jan 12 2022  1:46P     Addendum Interpreting Physician:  Addendum Transcribed by/Date: NO ADDENDUM  Addendum Electronically Signed by/Date:       1. Osteoporosis, unspecified osteoporosis type, unspecified pathological fracture presence  denosumab (Prolia) injection 60 mg    XR DEXA bone density axial skeleton w VFA    CBC and Auto Differential    Comprehensive Metabolic Panel    C-Reactive Protein    Creatine Kinase    Creatinine, Urine Random    Albumin , Urine Random    Parathyroid Hormone, Intact    Phosphorus    Magnesium    C-Telopeptide, Beta Cross Linked    " Vitamin D 25-Hydroxy,Total (for eval of Vitamin D levels)    Vitamin D 1,25 Dihydroxy (for eval of hypercalcemia)    Urinalysis with Reflex Culture and Microscopic    Thyroid Stimulating Hormone      2. Encounter for long-term (current) use of medications  CBC and Auto Differential    Comprehensive Metabolic Panel    C-Reactive Protein    Creatine Kinase    Creatinine, Urine Random    Albumin , Urine Random    Parathyroid Hormone, Intact    Phosphorus    Magnesium    C-Telopeptide, Beta Cross Linked    Vitamin D 25-Hydroxy,Total (for eval of Vitamin D levels)    Vitamin D 1,25 Dihydroxy (for eval of hypercalcemia)    Urinalysis with Reflex Culture and Microscopic    Thyroid Stimulating Hormone      3. Vitamin D deficiency  Vitamin D 25-Hydroxy,Total (for eval of Vitamin D levels)    Vitamin D 1,25 Dihydroxy (for eval of hypercalcemia)      4. Sicca, unspecified type (Multi)  Anti-SSB    Anti-SSA    IgG Subclasses (1, 2, 3, and 4)      5. MGUS (monoclonal gammopathy of unknown significance)             Doing well and here for routine Prolia which is overdue.   Prolia today.  Taking calcium and vit D.  Advised on execises.  Osteoporosis educational handout given.  Obtain previous Rheum records  Follow up with Dwain and Sheila  All questions answered.  Patient to follow up with primary care provider regarding all other medical issues not addressed today.     Alma Delia Mike MD      Patient Care Team:  Karla Justice MD as PCP - General (Internal Medicine)  Karla Justice MD as PCP - [facility] Attributed Provider

## 2024-05-21 ENCOUNTER — HOSPITAL ENCOUNTER (OUTPATIENT)
Dept: RADIOLOGY | Facility: HOSPITAL | Age: 79
Discharge: HOME | End: 2024-05-21
Payer: MEDICARE

## 2024-05-21 DIAGNOSIS — M81.0 OSTEOPOROSIS, UNSPECIFIED OSTEOPOROSIS TYPE, UNSPECIFIED PATHOLOGICAL FRACTURE PRESENCE: ICD-10-CM

## 2024-05-21 PROCEDURE — 77085 DXA BONE DENSITY AXL VRT FX: CPT

## 2024-05-21 PROCEDURE — 77085 DXA BONE DENSITY AXL VRT FX: CPT | Performed by: RADIOLOGY

## 2024-05-22 ENCOUNTER — LAB (OUTPATIENT)
Dept: LAB | Facility: LAB | Age: 79
End: 2024-05-22
Payer: MEDICARE

## 2024-05-22 DIAGNOSIS — D47.2 MONOCLONAL GAMMOPATHY: Primary | ICD-10-CM

## 2024-05-22 LAB
LDH SERPL L TO P-CCNC: 178 U/L (ref 84–246)
PROT SERPL-MCNC: 6.7 G/DL (ref 6.4–8.2)
URATE SERPL-MCNC: 3.8 MG/DL (ref 2.3–6.7)

## 2024-05-22 PROCEDURE — 88184 FLOWCYTOMETRY/ TC 1 MARKER: CPT

## 2024-05-22 PROCEDURE — 88189 FLOWCYTOMETRY/READ 16 & >: CPT

## 2024-05-22 PROCEDURE — 82784 ASSAY IGA/IGD/IGG/IGM EACH: CPT

## 2024-05-22 PROCEDURE — 88237 TISSUE CULTURE BONE MARROW: CPT

## 2024-05-22 PROCEDURE — 83615 LACTATE (LD) (LDH) ENZYME: CPT

## 2024-05-22 PROCEDURE — 36415 COLL VENOUS BLD VENIPUNCTURE: CPT

## 2024-05-22 PROCEDURE — 88185 FLOWCYTOMETRY/TC ADD-ON: CPT

## 2024-05-22 PROCEDURE — 84165 PROTEIN E-PHORESIS SERUM: CPT

## 2024-05-22 PROCEDURE — 85810 BLOOD VISCOSITY EXAMINATION: CPT

## 2024-05-22 PROCEDURE — 84155 ASSAY OF PROTEIN SERUM: CPT

## 2024-05-22 PROCEDURE — 83521 IG LIGHT CHAINS FREE EACH: CPT

## 2024-05-22 PROCEDURE — 86320 SERUM IMMUNOELECTROPHORESIS: CPT

## 2024-05-22 PROCEDURE — 85007 BL SMEAR W/DIFF WBC COUNT: CPT

## 2024-05-22 PROCEDURE — 85027 COMPLETE CBC AUTOMATED: CPT

## 2024-05-22 PROCEDURE — 86334 IMMUNOFIX E-PHORESIS SERUM: CPT

## 2024-05-22 PROCEDURE — 84550 ASSAY OF BLOOD/URIC ACID: CPT

## 2024-05-23 LAB
BASOPHILS # BLD MANUAL: 0 X10*3/UL (ref 0–0.1)
BASOPHILS NFR BLD MANUAL: 0 %
BURR CELLS BLD QL SMEAR: NORMAL
BURR CELLS BLD QL SMEAR: NORMAL
DACRYOCYTES BLD QL SMEAR: NORMAL
DACRYOCYTES BLD QL SMEAR: NORMAL
EOSINOPHIL # BLD MANUAL: 0.22 X10*3/UL (ref 0–0.4)
EOSINOPHIL NFR BLD MANUAL: 4 %
ERYTHROCYTE [DISTWIDTH] IN BLOOD BY AUTOMATED COUNT: 12.3 % (ref 11.5–14.5)
HCT VFR BLD AUTO: 40.7 % (ref 36–46)
HETEROPH AB SER QL: 1.57 CP (ref 1.5–1.8)
HGB BLD-MCNC: 12.5 G/DL (ref 12–16)
HOLD SPECIMEN: NORMAL
IGA SERPL-MCNC: 251 MG/DL (ref 70–400)
IGG SERPL-MCNC: 841 MG/DL (ref 700–1600)
IGM SERPL-MCNC: 218 MG/DL (ref 40–230)
IMM GRANULOCYTES # BLD AUTO: 0.01 X10*3/UL (ref 0–0.5)
IMM GRANULOCYTES NFR BLD AUTO: 0.2 % (ref 0–0.9)
KAPPA LC SERPL-MCNC: 2.3 MG/DL (ref 0.33–1.94)
KAPPA LC/LAMBDA SER: 1.51 {RATIO} (ref 0.26–1.65)
LAMBDA LC SERPL-MCNC: 1.52 MG/DL (ref 0.57–2.63)
LYMPHOCYTES # BLD MANUAL: 1.23 X10*3/UL (ref 0.8–3)
LYMPHOCYTES NFR BLD MANUAL: 22 %
MCH RBC QN AUTO: 30.2 PG (ref 26–34)
MCHC RBC AUTO-ENTMCNC: 30.7 G/DL (ref 32–36)
MCV RBC AUTO: 98 FL (ref 80–100)
MONOCYTES # BLD MANUAL: 0.17 X10*3/UL (ref 0.05–0.8)
MONOCYTES NFR BLD MANUAL: 3 %
NEUTROPHILS # BLD MANUAL: 3.97 X10*3/UL (ref 1.6–5.5)
NEUTS BAND # BLD MANUAL: 0.22 X10*3/UL (ref 0–0.5)
NEUTS BAND NFR BLD MANUAL: 4 %
NEUTS SEG # BLD MANUAL: 3.75 X10*3/UL (ref 1.6–5)
NEUTS SEG NFR BLD MANUAL: 67 %
NRBC BLD-RTO: 0 /100 WBCS (ref 0–0)
PLATELET # BLD AUTO: 241 X10*3/UL (ref 150–450)
RBC # BLD AUTO: 4.14 X10*6/UL (ref 4–5.2)
RBC MORPH BLD: NORMAL
RBC MORPH BLD: NORMAL
TOTAL CELLS COUNTED BLD: 100
WBC # BLD AUTO: 5.6 X10*3/UL (ref 4.4–11.3)

## 2024-05-28 LAB
ALBUMIN: 4 G/DL (ref 3.4–5)
ALPHA 1 GLOBULIN: 0.3 G/DL (ref 0.2–0.6)
ALPHA 2 GLOBULIN: 0.7 G/DL (ref 0.4–1.1)
BETA GLOBULIN: 0.9 G/DL (ref 0.5–1.2)
CELL COUNT (BLOOD): 5.6 X10*3/UL
CELL POPULATIONS: NORMAL
CYTOGENETICS/MOLECULAR TEST ORDERED: NO
DIAGNOSIS: NORMAL
FLOW DIFFERENTIAL: NORMAL
FLOW TEST ORDERED: NORMAL
GAMMA GLOBULIN: 0.8 G/DL (ref 0.5–1.4)
IMMUNOFIXATION COMMENT: ABNORMAL
LAB TEST METHOD: NORMAL
M-PROTEIN 1: 0.2 G/DL
NUMBER OF CELLS COLLECTED: NORMAL PER TUBE
PATH REPORT.TOTAL CANCER: NORMAL
PATH REVIEW - SERUM IMMUNOFIXATION: ABNORMAL
PATH REVIEW-SERUM PROTEIN ELECTROPHORESIS: ABNORMAL
PROTEIN ELECTROPHORESIS COMMENT: ABNORMAL
RBC MORPH BLD: NORMAL
SIGNATURE COMMENT: NORMAL
SPECIMEN VIABILITY: NORMAL
WBC MORPH BLD: NORMAL

## 2024-05-29 ENCOUNTER — TELEPHONE (OUTPATIENT)
Dept: PRIMARY CARE | Facility: CLINIC | Age: 79
End: 2024-05-29
Payer: MEDICARE

## 2024-05-29 LAB
CHROM ANALY OVERALL INTERP-IMP: NORMAL
ELECTRONICALLY SIGNED BY CYTOGENETICS: NORMAL

## 2024-06-05 ENCOUNTER — OFFICE VISIT (OUTPATIENT)
Dept: HEMATOLOGY/ONCOLOGY | Facility: HOSPITAL | Age: 79
End: 2024-06-05
Payer: MEDICARE

## 2024-06-05 VITALS
TEMPERATURE: 96.1 F | BODY MASS INDEX: 19.58 KG/M2 | OXYGEN SATURATION: 100 % | SYSTOLIC BLOOD PRESSURE: 134 MMHG | WEIGHT: 125 LBS | RESPIRATION RATE: 16 BRPM | DIASTOLIC BLOOD PRESSURE: 70 MMHG | HEART RATE: 65 BPM

## 2024-06-05 DIAGNOSIS — D47.2 MGUS (MONOCLONAL GAMMOPATHY OF UNKNOWN SIGNIFICANCE): Primary | ICD-10-CM

## 2024-06-05 DIAGNOSIS — R20.0 NUMBNESS OF FEET: ICD-10-CM

## 2024-06-05 PROCEDURE — 1159F MED LIST DOCD IN RCRD: CPT

## 2024-06-05 PROCEDURE — 99215 OFFICE O/P EST HI 40 MIN: CPT

## 2024-06-05 PROCEDURE — 1126F AMNT PAIN NOTED NONE PRSNT: CPT

## 2024-06-05 ASSESSMENT — ENCOUNTER SYMPTOMS
FATIGUE: 0
NEUROLOGICAL NEGATIVE: 1
ENDOCRINE NEGATIVE: 1
ALLERGIC/IMMUNOLOGIC NEGATIVE: 1
GASTROINTESTINAL NEGATIVE: 1
CONSTITUTIONAL NEGATIVE: 1
MUSCULOSKELETAL NEGATIVE: 1
HEMATOLOGIC/LYMPHATIC NEGATIVE: 1
PSYCHIATRIC NEGATIVE: 1
DIAPHORESIS: 0
UNEXPECTED WEIGHT CHANGE: 0
RESPIRATORY NEGATIVE: 1
EYES NEGATIVE: 1
CARDIOVASCULAR NEGATIVE: 1
APPETITE CHANGE: 0

## 2024-06-05 ASSESSMENT — PAIN SCALES - GENERAL: PAINLEVEL_OUTOF10: 0-NO PAIN

## 2024-06-05 NOTE — PROGRESS NOTES
Patient ID: Donna Cadet is a 78 y.o. female.  Referring Physician: No referring provider defined for this encounter.  Primary Care Provider: Karla Justice MD  Date of Service:  6/5/2024    IgM Lambda MGUS:    Ms. Donna Cadet is a 78 year old female. Has a past medical history of anemia, constipation, chronic cervical neck pain and lumbar back pain, dyslipidemia, neuropathy,  osteoarthritis of the hip, and osteoporosis. Has had an IgM Lambda M protein detected since 2009. Formerly followed with Dr. Chaparro who has retired.     Workup:  SPEP (9/22/09): 0.6g/dL IgM Lambda M protein  SFLC: never drawn  Immunoglobulins (4/10/15): IgM 340    Medical History:  Lumbar back pain  Cervical neck pain  Osteoporosis  Osteoarthritis of hip  Dyslipidemia  Neuropathy of feet  Constipation     Surgical History:  Complete colonoscopy  Lumbar vertebral fusion  Parathyroidectomy  Cervical vertebral body resection     Social History:    Never smoker      Family History:   Father-Alzheimer's, DM  Sister-DM     SUBJECTIVE:  History of Present Illness:  Donna presents to clinic 6/5/24 for her annual visit.    Overall she is feeling well and is without acute complaints.    Had some neuropathy in her feet that she thought may be related to previous cervical spine surgery. This was ruled out and is not bothersome for her.         Review of Systems   Constitutional: Negative.  Negative for appetite change, diaphoresis, fatigue and unexpected weight change.   HENT: Negative.     Eyes: Negative.    Respiratory: Negative.     Cardiovascular: Negative.    Gastrointestinal: Negative.    Endocrine: Negative.    Genitourinary: Negative.    Musculoskeletal: Negative.    Skin: Negative.    Allergic/Immunologic: Negative.    Neurological: Negative.    Hematological: Negative.    Psychiatric/Behavioral: Negative.       OBJECTIVE:  KPS: Karnofsky Score: 100 - Fully active, able to carry on all pre-disease performed without restriction    VS:  /70 (BP Location: Left arm, Patient Position: Sitting, BP Cuff Size: Adult)   Pulse 65   Temp 35.6 °C (96.1 °F) (Skin)   Resp 16   Wt 56.7 kg (125 lb)   SpO2 100%   BMI 19.58 kg/m²   BSA: 1.64 meters squared    Physical Exam  Constitutional:       Appearance: Normal appearance. She is normal weight.   HENT:      Head: Normocephalic and atraumatic.      Nose: Nose normal.      Mouth/Throat:      Mouth: Mucous membranes are moist.      Pharynx: Oropharynx is clear.   Eyes:      Conjunctiva/sclera: Conjunctivae normal.      Pupils: Pupils are equal, round, and reactive to light.   Cardiovascular:      Rate and Rhythm: Normal rate and regular rhythm.      Pulses: Normal pulses.      Heart sounds: Normal heart sounds.   Pulmonary:      Effort: Pulmonary effort is normal.      Breath sounds: Normal breath sounds.   Abdominal:      General: Abdomen is flat. Bowel sounds are normal.      Palpations: Abdomen is soft.   Musculoskeletal:         General: Normal range of motion.      Cervical back: Normal range of motion and neck supple.   Skin:     General: Skin is warm and dry.   Neurological:      General: No focal deficit present.      Mental Status: She is alert and oriented to person, place, and time. Mental status is at baseline.   Psychiatric:         Mood and Affect: Mood normal.         Behavior: Behavior normal.         Thought Content: Thought content normal.         Judgment: Judgment normal.       Laboratory:  The pertinent laboratory results were reviewed and discussed with the patient.    Lab Results   Component Value Date    WBC 5.6 05/22/2024    HCT 40.7 05/22/2024    HGB 12.5 05/22/2024     05/22/2024    ANC 3.97 05/22/2024    K 4.3 04/12/2024    CALCIUM 9.5 04/12/2024     04/12/2024    MG 1.70 04/12/2024    ALT 17 04/12/2024    AST 17 04/12/2024    BUN 24 (H) 04/12/2024    CREATININE 0.97 04/12/2024    PHOS 4.1 04/12/2024    KAPPA 2.30 (H) 05/22/2024    LAMBDA 1.52 05/22/2024     KAPLS 1.51 05/22/2024    SPEP Aberrant band detected. See immunofixation. 05/22/2024    IEPIN  05/22/2024 5/22/24  Known monoclonal IgM lambda in the gamma region at 0.2 g/dL.  Unchanged from the previous analysis on 5/24/23.     05/22/2024     05/22/2024     05/22/2024      Note: for a comprehensive list of the patient's lab results, access the Results Review activity.    ASSESSMENT and PLAN:    MGUS:  - IgM Lambda M protein detected in 9/2009 at 0.6g/dL  - Denies B symptoms, s/s of hyperviscosity, no SLiM CRAB criteria  - Labs stable      Back Pain:  - S/p vertebral body resection of cervical segment, lumbar vertebral fusion    RTC:  1 year with Renee Winters CNP at [facility]    JOSE MARIA Martin-CNP

## 2024-07-09 ENCOUNTER — APPOINTMENT (OUTPATIENT)
Dept: PRIMARY CARE | Facility: CLINIC | Age: 79
End: 2024-07-09
Payer: MEDICARE

## 2024-07-09 VITALS
DIASTOLIC BLOOD PRESSURE: 70 MMHG | OXYGEN SATURATION: 98 % | RESPIRATION RATE: 16 BRPM | HEART RATE: 64 BPM | WEIGHT: 127.21 LBS | HEIGHT: 67 IN | SYSTOLIC BLOOD PRESSURE: 118 MMHG | TEMPERATURE: 97.3 F | BODY MASS INDEX: 19.97 KG/M2

## 2024-07-09 DIAGNOSIS — E55.9 VITAMIN D DEFICIENCY: ICD-10-CM

## 2024-07-09 DIAGNOSIS — Z86.39 HISTORY OF HYPERGLYCEMIA: ICD-10-CM

## 2024-07-09 DIAGNOSIS — E78.5 HYPERLIPIDEMIA, UNSPECIFIED: ICD-10-CM

## 2024-07-09 DIAGNOSIS — R73.9 HYPERGLYCEMIA: ICD-10-CM

## 2024-07-09 DIAGNOSIS — Z00.00 ROUTINE GENERAL MEDICAL EXAMINATION AT HEALTH CARE FACILITY: Primary | ICD-10-CM

## 2024-07-09 DIAGNOSIS — Z00.00 WELLNESS EXAMINATION: ICD-10-CM

## 2024-07-09 DIAGNOSIS — D47.2 MGUS (MONOCLONAL GAMMOPATHY OF UNKNOWN SIGNIFICANCE): ICD-10-CM

## 2024-07-09 DIAGNOSIS — Z12.31 SCREENING MAMMOGRAM FOR BREAST CANCER: ICD-10-CM

## 2024-07-09 PROCEDURE — 1159F MED LIST DOCD IN RCRD: CPT | Performed by: INTERNAL MEDICINE

## 2024-07-09 PROCEDURE — 99214 OFFICE O/P EST MOD 30 MIN: CPT | Performed by: INTERNAL MEDICINE

## 2024-07-09 PROCEDURE — 1126F AMNT PAIN NOTED NONE PRSNT: CPT | Performed by: INTERNAL MEDICINE

## 2024-07-09 PROCEDURE — 1170F FXNL STATUS ASSESSED: CPT | Performed by: INTERNAL MEDICINE

## 2024-07-09 PROCEDURE — 1160F RVW MEDS BY RX/DR IN RCRD: CPT | Performed by: INTERNAL MEDICINE

## 2024-07-09 PROCEDURE — G0439 PPPS, SUBSEQ VISIT: HCPCS | Performed by: INTERNAL MEDICINE

## 2024-07-09 PROCEDURE — 1036F TOBACCO NON-USER: CPT | Performed by: INTERNAL MEDICINE

## 2024-07-09 RX ORDER — SIMVASTATIN 10 MG/1
10 TABLET, FILM COATED ORAL NIGHTLY
Qty: 90 TABLET | Refills: 0 | Status: SHIPPED | OUTPATIENT
Start: 2024-07-09

## 2024-07-09 RX ORDER — ASPIRIN 325 MG
TABLET, DELAYED RELEASE (ENTERIC COATED) ORAL
Qty: 12 CAPSULE | Refills: 1 | Status: SHIPPED | OUTPATIENT
Start: 2024-07-09

## 2024-07-09 ASSESSMENT — ACTIVITIES OF DAILY LIVING (ADL)
GROCERY_SHOPPING: INDEPENDENT
MANAGING_FINANCES: INDEPENDENT
DRESSING: INDEPENDENT
BATHING: INDEPENDENT
TAKING_MEDICATION: INDEPENDENT
DOING_HOUSEWORK: INDEPENDENT

## 2024-07-09 ASSESSMENT — ENCOUNTER SYMPTOMS
DEPRESSION: 0
HEADACHES: 0
LIGHT-HEADEDNESS: 0
HYPERACTIVE: 0
SHORTNESS OF BREATH: 0
LOSS OF SENSATION IN FEET: 0
TREMORS: 0
PALPITATIONS: 0
OCCASIONAL FEELINGS OF UNSTEADINESS: 0
FATIGUE: 0
AGITATION: 0

## 2024-07-09 ASSESSMENT — PAIN SCALES - GENERAL: PAINLEVEL: 0-NO PAIN

## 2024-07-09 NOTE — PATIENT INSTRUCTIONS
Increase weight bearing exercise, return to lab fasting 10 h, increase hydration during the day, avoid anti inflamatories ( advil, motrim, aleeve, aspirin), if your insomnia worsen , you may return here. Use on left ear debrox over the counter weekly to remove ear. Complete your mammogram and dermatology visits. Return sooner as needed.

## 2024-07-09 NOTE — ASSESSMENT & PLAN NOTE
Discussed diet, exercise, immunizations, and screening appropriate for their age.  Colonoscopy: 2022 no longer  Dexa: may 2024 ,follow with rheumatologist, on prolia  Mammogram: oct 2023

## 2024-07-09 NOTE — PROGRESS NOTES
"Subjective   Patient ID: Donna Cadet is a 79 y.o. female who presents for Medicare Annual Wellness Visit Subsequent.    HPI Diet is balance, 3 meals a day, daily 3 servings of fresh fruit, vegetable and salad, protein mostly white , eggs, ocassional red meats. Loves savory cruncky.  She continues walking year around.  Insomnia to maintain sleep couple of times /week. Ocassionally takes benadryl. She relaxes listening to music.  Continues getting Prolia injections.    Review of Systems   Constitutional:  Negative for fatigue.   Respiratory:  Negative for shortness of breath.    Cardiovascular:  Negative for chest pain, palpitations and leg swelling.   Gastrointestinal:         Slight improvement in dysphagia since cervical surgery.  Bm using miralax, dry fruits to have small bm daily   Musculoskeletal:         Stifffness and aches in different joints, not intense enough to require any meds.   Neurological:  Negative for tremors, light-headedness and headaches.   Psychiatric/Behavioral:  Negative for agitation. The patient is not hyperactive.    All other systems reviewed and are negative.      Objective   /70 (BP Location: Right arm, Patient Position: Sitting, BP Cuff Size: Adult)   Pulse 64   Temp 36.3 °C (97.3 °F)   Resp 16   Ht 1.702 m (5' 7\")   Wt 57.7 kg (127 lb 3.3 oz)   SpO2 98%   BMI 19.92 kg/m²     Physical Exam  Eyes - conjunctiva clear, PERRLA  HEENT - partial left impacted wax  Neck - No cervical lymphadenopathy,  thyromegaly  Axilla - no palpable lymphadenopathy  Breast - visual exam: no asymmetry; palpation: nontender, no palpable nodules, retractions or discharge bilaterally  Cardiac - regular rate and rhythm, no murmurs, no carotid bruit, no JVP  Lung- clear to auscultation, no rales, no rhonchi, no wheezing  GI- normally active bowel sounds, nontender, nondistended, no hepatosplenomegaly, no rebound  MSK - no deformities  Neuro - non focal, oriented x 3  Extremities - no edema, good " distal arterial pulses  Skin - no rash, dry, multiple moles, freckles  Psychiatric - pleasant, cooperative, no hallucinations    Assessment/Plan   Problem List Items Addressed This Visit             ICD-10-CM    Hyperlipidemia, unspecified E78.5     Pending new lipid panel to adjust statin.         Relevant Medications    simvastatin (Zocor) 10 mg tablet    Other Relevant Orders    Lipid panel    Hyperglycemia R73.9    Relevant Orders    Glucose, fasting    MGUS (monoclonal gammopathy of unknown significance) D47.2     Stable, continue onco follow  up         Vitamin D deficiency E55.9     Level good, continue current dose         Relevant Medications    cholecalciferol (Vitamin D-3) 50,000 unit capsule    Wellness examination Z00.00     Discussed diet, exercise, immunizations, and screening appropriate for their age.  Colonoscopy: 2022 no longer  Dexa: may 2024 ,follow with rheumatologist, on prolia  Mammogram: oct 2023              Other Visit Diagnoses         Codes    Routine general medical examination at health care facility    -  Primary Z00.00    Screening mammogram for breast cancer     Z12.31    Relevant Orders    BI mammo bilateral screening tomosynthesis    History of hyperglycemia     Z86.39    Relevant Orders    Hemoglobin A1C    Glucose, fasting    TSH with reflex to Free T4 if abnormal

## 2024-07-16 ENCOUNTER — LAB (OUTPATIENT)
Dept: LAB | Facility: LAB | Age: 79
End: 2024-07-16
Payer: MEDICARE

## 2024-07-16 DIAGNOSIS — R73.9 HYPERGLYCEMIA: ICD-10-CM

## 2024-07-16 DIAGNOSIS — Z86.39 HISTORY OF HYPERGLYCEMIA: ICD-10-CM

## 2024-07-16 DIAGNOSIS — E78.5 HYPERLIPIDEMIA, UNSPECIFIED: ICD-10-CM

## 2024-07-16 LAB
CHOLEST SERPL-MCNC: 149 MG/DL (ref 0–199)
CHOLESTEROL/HDL RATIO: 2.4
EST. AVERAGE GLUCOSE BLD GHB EST-MCNC: 128 MG/DL
GLUCOSE P FAST SERPL-MCNC: 96 MG/DL (ref 74–99)
HBA1C MFR BLD: 6.1 %
HDLC SERPL-MCNC: 63.2 MG/DL
LDLC SERPL CALC-MCNC: 72 MG/DL
NON HDL CHOLESTEROL: 86 MG/DL (ref 0–149)
TRIGL SERPL-MCNC: 71 MG/DL (ref 0–149)
TSH SERPL-ACNC: 3.49 MIU/L (ref 0.44–3.98)
VLDL: 14 MG/DL (ref 0–40)

## 2024-07-16 PROCEDURE — 84443 ASSAY THYROID STIM HORMONE: CPT

## 2024-07-16 PROCEDURE — 36415 COLL VENOUS BLD VENIPUNCTURE: CPT

## 2024-07-16 PROCEDURE — 83036 HEMOGLOBIN GLYCOSYLATED A1C: CPT

## 2024-07-16 PROCEDURE — 80061 LIPID PANEL: CPT

## 2024-07-16 PROCEDURE — 82947 ASSAY GLUCOSE BLOOD QUANT: CPT

## 2024-07-26 ENCOUNTER — TELEPHONE (OUTPATIENT)
Dept: PRIMARY CARE | Facility: CLINIC | Age: 79
End: 2024-07-26
Payer: MEDICARE

## 2024-08-16 ENCOUNTER — HOSPITAL ENCOUNTER (EMERGENCY)
Facility: HOSPITAL | Age: 79
Discharge: HOME | End: 2024-08-16
Attending: EMERGENCY MEDICINE
Payer: MEDICARE

## 2024-08-16 ENCOUNTER — APPOINTMENT (OUTPATIENT)
Dept: CARDIOLOGY | Facility: HOSPITAL | Age: 79
End: 2024-08-16
Payer: MEDICARE

## 2024-08-16 ENCOUNTER — APPOINTMENT (OUTPATIENT)
Dept: RADIOLOGY | Facility: HOSPITAL | Age: 79
End: 2024-08-16
Payer: MEDICARE

## 2024-08-16 VITALS
SYSTOLIC BLOOD PRESSURE: 107 MMHG | DIASTOLIC BLOOD PRESSURE: 59 MMHG | RESPIRATION RATE: 16 BRPM | TEMPERATURE: 97.7 F | WEIGHT: 127 LBS | HEART RATE: 62 BPM | HEIGHT: 67 IN | OXYGEN SATURATION: 96 % | BODY MASS INDEX: 19.93 KG/M2

## 2024-08-16 DIAGNOSIS — U07.1 COVID: ICD-10-CM

## 2024-08-16 DIAGNOSIS — R55 NEAR SYNCOPE: Primary | ICD-10-CM

## 2024-08-16 LAB
ALBUMIN SERPL BCP-MCNC: 3.7 G/DL (ref 3.4–5)
ALBUMIN SERPL BCP-MCNC: 3.8 G/DL (ref 3.4–5)
ALP SERPL-CCNC: 34 U/L (ref 33–136)
ALP SERPL-CCNC: 38 U/L (ref 33–136)
ALT SERPL W P-5'-P-CCNC: 15 U/L (ref 7–45)
ALT SERPL W P-5'-P-CCNC: 16 U/L (ref 7–45)
ANION GAP SERPL CALC-SCNC: 14 MMOL/L (ref 10–20)
ANION GAP SERPL CALC-SCNC: 15 MMOL/L (ref 10–20)
APTT PPP: 25 SECONDS (ref 27–38)
AST SERPL W P-5'-P-CCNC: 19 U/L (ref 9–39)
AST SERPL W P-5'-P-CCNC: 35 U/L (ref 9–39)
BASOPHILS # BLD AUTO: 0.02 X10*3/UL (ref 0–0.1)
BASOPHILS NFR BLD AUTO: 0.3 %
BILIRUB SERPL-MCNC: 0.5 MG/DL (ref 0–1.2)
BILIRUB SERPL-MCNC: 0.6 MG/DL (ref 0–1.2)
BUN SERPL-MCNC: 18 MG/DL (ref 6–23)
BUN SERPL-MCNC: 19 MG/DL (ref 6–23)
CALCIUM SERPL-MCNC: 8 MG/DL (ref 8.6–10.3)
CALCIUM SERPL-MCNC: 8.1 MG/DL (ref 8.6–10.3)
CARDIAC TROPONIN I PNL SERPL HS: <3 NG/L (ref 0–13)
CARDIAC TROPONIN I PNL SERPL HS: <3 NG/L (ref 0–13)
CHLORIDE SERPL-SCNC: 104 MMOL/L (ref 98–107)
CHLORIDE SERPL-SCNC: 106 MMOL/L (ref 98–107)
CO2 SERPL-SCNC: 20 MMOL/L (ref 21–32)
CO2 SERPL-SCNC: 20 MMOL/L (ref 21–32)
CREAT SERPL-MCNC: 0.77 MG/DL (ref 0.5–1.05)
CREAT SERPL-MCNC: 0.82 MG/DL (ref 0.5–1.05)
EGFRCR SERPLBLD CKD-EPI 2021: 73 ML/MIN/1.73M*2
EGFRCR SERPLBLD CKD-EPI 2021: 79 ML/MIN/1.73M*2
EOSINOPHIL # BLD AUTO: 0.01 X10*3/UL (ref 0–0.4)
EOSINOPHIL NFR BLD AUTO: 0.2 %
ERYTHROCYTE [DISTWIDTH] IN BLOOD BY AUTOMATED COUNT: 12.5 % (ref 11.5–14.5)
GLUCOSE SERPL-MCNC: 116 MG/DL (ref 74–99)
GLUCOSE SERPL-MCNC: 120 MG/DL (ref 74–99)
HCT VFR BLD AUTO: 38.6 % (ref 36–46)
HGB BLD-MCNC: 12 G/DL (ref 12–16)
IMM GRANULOCYTES # BLD AUTO: 0.01 X10*3/UL (ref 0–0.5)
IMM GRANULOCYTES NFR BLD AUTO: 0.2 % (ref 0–0.9)
INR PPP: 1.1 (ref 0.9–1.1)
LYMPHOCYTES # BLD AUTO: 0.97 X10*3/UL (ref 0.8–3)
LYMPHOCYTES NFR BLD AUTO: 14.9 %
MCH RBC QN AUTO: 30.1 PG (ref 26–34)
MCHC RBC AUTO-ENTMCNC: 31.1 G/DL (ref 32–36)
MCV RBC AUTO: 97 FL (ref 80–100)
MONOCYTES # BLD AUTO: 0.7 X10*3/UL (ref 0.05–0.8)
MONOCYTES NFR BLD AUTO: 10.8 %
NEUTROPHILS # BLD AUTO: 4.78 X10*3/UL (ref 1.6–5.5)
NEUTROPHILS NFR BLD AUTO: 73.6 %
NRBC BLD-RTO: 0 /100 WBCS (ref 0–0)
PLATELET # BLD AUTO: 184 X10*3/UL (ref 150–450)
POTASSIUM SERPL-SCNC: 4 MMOL/L (ref 3.5–5.3)
POTASSIUM SERPL-SCNC: 6.2 MMOL/L (ref 3.5–5.3)
PROT SERPL-MCNC: 6.1 G/DL (ref 6.4–8.2)
PROT SERPL-MCNC: 6.6 G/DL (ref 6.4–8.2)
PROTHROMBIN TIME: 12.9 SECONDS (ref 9.8–12.8)
RBC # BLD AUTO: 3.99 X10*6/UL (ref 4–5.2)
SARS-COV-2 RNA RESP QL NAA+PROBE: DETECTED
SODIUM SERPL-SCNC: 133 MMOL/L (ref 136–145)
SODIUM SERPL-SCNC: 136 MMOL/L (ref 136–145)
WBC # BLD AUTO: 6.5 X10*3/UL (ref 4.4–11.3)

## 2024-08-16 PROCEDURE — 96374 THER/PROPH/DIAG INJ IV PUSH: CPT

## 2024-08-16 PROCEDURE — 99284 EMERGENCY DEPT VISIT MOD MDM: CPT | Mod: 25

## 2024-08-16 PROCEDURE — 93005 ELECTROCARDIOGRAM TRACING: CPT

## 2024-08-16 PROCEDURE — 80053 COMPREHEN METABOLIC PANEL: CPT | Performed by: EMERGENCY MEDICINE

## 2024-08-16 PROCEDURE — 85025 COMPLETE CBC W/AUTO DIFF WBC: CPT | Performed by: EMERGENCY MEDICINE

## 2024-08-16 PROCEDURE — 85730 THROMBOPLASTIN TIME PARTIAL: CPT | Performed by: EMERGENCY MEDICINE

## 2024-08-16 PROCEDURE — 71045 X-RAY EXAM CHEST 1 VIEW: CPT | Mod: FOREIGN READ | Performed by: RADIOLOGY

## 2024-08-16 PROCEDURE — 84484 ASSAY OF TROPONIN QUANT: CPT | Performed by: EMERGENCY MEDICINE

## 2024-08-16 PROCEDURE — 84075 ASSAY ALKALINE PHOSPHATASE: CPT | Performed by: EMERGENCY MEDICINE

## 2024-08-16 PROCEDURE — 2500000004 HC RX 250 GENERAL PHARMACY W/ HCPCS (ALT 636 FOR OP/ED): Performed by: EMERGENCY MEDICINE

## 2024-08-16 PROCEDURE — 36415 COLL VENOUS BLD VENIPUNCTURE: CPT | Performed by: EMERGENCY MEDICINE

## 2024-08-16 PROCEDURE — 71045 X-RAY EXAM CHEST 1 VIEW: CPT

## 2024-08-16 PROCEDURE — 85610 PROTHROMBIN TIME: CPT | Performed by: EMERGENCY MEDICINE

## 2024-08-16 PROCEDURE — 87635 SARS-COV-2 COVID-19 AMP PRB: CPT | Performed by: EMERGENCY MEDICINE

## 2024-08-16 RX ORDER — ONDANSETRON HYDROCHLORIDE 2 MG/ML
4 INJECTION, SOLUTION INTRAVENOUS ONCE
Status: COMPLETED | OUTPATIENT
Start: 2024-08-16 | End: 2024-08-16

## 2024-08-16 RX ORDER — ONDANSETRON 4 MG/1
4 TABLET, FILM COATED ORAL EVERY 6 HOURS
Qty: 12 TABLET | Refills: 0 | Status: SHIPPED | OUTPATIENT
Start: 2024-08-16 | End: 2024-08-19

## 2024-08-16 ASSESSMENT — PAIN SCALES - GENERAL: PAINLEVEL_OUTOF10: 0 - NO PAIN

## 2024-08-16 ASSESSMENT — PAIN - FUNCTIONAL ASSESSMENT: PAIN_FUNCTIONAL_ASSESSMENT: 0-10

## 2024-08-16 ASSESSMENT — COLUMBIA-SUICIDE SEVERITY RATING SCALE - C-SSRS
2. HAVE YOU ACTUALLY HAD ANY THOUGHTS OF KILLING YOURSELF?: NO
6. HAVE YOU EVER DONE ANYTHING, STARTED TO DO ANYTHING, OR PREPARED TO DO ANYTHING TO END YOUR LIFE?: NO
1. IN THE PAST MONTH, HAVE YOU WISHED YOU WERE DEAD OR WISHED YOU COULD GO TO SLEEP AND NOT WAKE UP?: NO

## 2024-08-17 NOTE — ED PROVIDER NOTES
HPI   Chief Complaint   Patient presents with   • Weakness, Gen     Patient tested positive for covid with at home test today. Symptoms began yesterday (headache, nasal congestion, fatigue).        HPI: 79-year-old female arrives with cough feeling very weak and dehydrated denies shortness of breath denies chest pain some nausea with some dry heaves currently only mild nausea.  Relieved with Zofran in emergency.  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      PMH: Prior cervical spine surgery with esophageal complications    SH negative tobacco social alcohol  FH negative heart disease positive diabetes  ROS  General Appears in distress  HEENT: No sore throat, No Visual Loss, No Headache, No Ear Pain  Neck: Denies neck pain  Chest: No chest pain, no pleuritic pain, no chest wall injury  Pulmonary: No SOB, positive cough, No Sputum production, No Wheezing  GI: No abdominal pain, 1 episode nausea or vomiting, no diarrhea.  : No dysuria, no frequency, no hematuria.  Extremities: No musculoskeletal pain, normal ambulation, no paresthesia.  Psych: Normal interaction, no anxiety, no depression, no suicidal ideation  Skin: No rashes    ROS is otherwise negative    PE: General: Appears in distress        HEENT: Throat is moist without exudate, midline uvula dentate intact, Tms clear with normal anatomy.        Neck: Supple non  tender        Chest CTA, good AE, no wheezing, rales, or rhonci        CVA: RRR S1S2 no S3S4 or murmur        ABD: W/S/NT no HSM, no pulsatile masses, good bowel sounds        Extremities: Excellent distal pulses, brisk capillary refill. Full ROM        Psych: Normal interactions with no signs of depression  or suicidal ideation.        Neuro: Alert and oriented, moves all and feels all.    MDM: 79-year-old female arrives with cough feeling very weak and dehydrated denies shortness of breath denies chest pain some nausea with some dry heaves currently only mild nausea.  Relieved with Zofran in emergency.  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    EKG read by me reviewed by me normal sinus rhythm 66 bpm normal axis nonspecific ST to T wave changes in the inferior leads as seen in 2017              Patient History   Past Medical History:   Diagnosis Date   • Age-related nuclear cataract of both eyes    • Age-related osteoporosis without current pathological fracture 07/10/2013    Osteoporosis   • Dry eye syndrome of bilateral lacrimal glands    • History of hyperparathyroidism     s/p resection   • MGUS (monoclonal gammopathy of unknown significance)    • Multiple thyroid nodules    • Other conditions influencing health status     Bone Density Studies Dual-Energy X-ray Absorptiometry   • Other hemorrhoids 10/03/2017     Bleeding internal hemorrhoids   • Other specified disorders of temporomandibular joint 08/03/2017    TMJ inflammation   • Pyuria 03/25/2014    Pyuria   • Unspecified disorder of refraction      Past Surgical History:   Procedure Laterality Date   • COLONOSCOPY  04/20/2013    Complete Colonoscopy   • LUMBAR FUSION  01/05/2017    Lumbar Vertebral Fusion   • OTHER SURGICAL HISTORY  04/20/2013    Parathyroid Complete Parathyroidectomy   • OTHER SURGICAL HISTORY  05/10/2018    Vertebral Body Resection Cervical Segment     Family History   Problem Relation Name Age of Onset   • Diabetes Father     • Alzheimer's disease Father     • Diabetes Sister          type 2     Social History     Tobacco Use   • Smoking status: Never   • Smokeless tobacco: Never   Vaping Use   • Vaping status: Never Used   Substance Use Topics   • Alcohol use: Yes   • Drug use: Never       Physical Exam   ED Triage Vitals [08/16/24 1917]   Temperature Heart Rate Respirations BP   36.5 °C (97.7 °F) 62 16 135/68      Pulse Ox Temp Source Heart Rate Source Patient Position   97 % Oral Monitor Sitting      BP Location FiO2 (%)     -- --       Physical Exam      ED Course & MDM   Diagnoses as of 08/16/24 2229   Near syncope   COVID                 No data recorded     [place] Coma Scale Score: 15 (08/16/24 1922 : Tanya Carter, GINI)                           Medical Decision Making      Procedure  Procedures     Escobar Sanders MD  08/16/24 2234

## 2024-08-19 ENCOUNTER — TELEPHONE (OUTPATIENT)
Dept: PRIMARY CARE | Facility: CLINIC | Age: 79
End: 2024-08-19
Payer: MEDICARE

## 2024-08-19 NOTE — TELEPHONE ENCOUNTER
Pt went to ER due to dehydration during covid, she is still recovering, but feeling better. she has abnormal single view chest x ray and needs follow up. We scheduled.

## 2024-08-20 LAB
ATRIAL RATE: 66 BPM
P AXIS: 58 DEGREES
P OFFSET: 204 MS
P ONSET: 162 MS
PR INTERVAL: 120 MS
Q ONSET: 222 MS
QRS COUNT: 11 BEATS
QRS DURATION: 76 MS
QT INTERVAL: 426 MS
QTC CALCULATION(BAZETT): 446 MS
QTC FREDERICIA: 440 MS
R AXIS: 57 DEGREES
T AXIS: 10 DEGREES
T OFFSET: 435 MS
VENTRICULAR RATE: 66 BPM

## 2024-08-27 ENCOUNTER — APPOINTMENT (OUTPATIENT)
Dept: PRIMARY CARE | Facility: CLINIC | Age: 79
End: 2024-08-27
Payer: MEDICARE

## 2024-08-27 VITALS
HEIGHT: 67 IN | BODY MASS INDEX: 19.38 KG/M2 | SYSTOLIC BLOOD PRESSURE: 118 MMHG | DIASTOLIC BLOOD PRESSURE: 78 MMHG | OXYGEN SATURATION: 97 % | WEIGHT: 123.46 LBS | RESPIRATION RATE: 18 BRPM | HEART RATE: 71 BPM

## 2024-08-27 DIAGNOSIS — R91.8 ABNORMAL FINDING ON LUNG IMAGING: Primary | ICD-10-CM

## 2024-08-27 DIAGNOSIS — H61.22 IMPACTED CERUMEN OF LEFT EAR: ICD-10-CM

## 2024-08-27 PROCEDURE — 99213 OFFICE O/P EST LOW 20 MIN: CPT | Performed by: INTERNAL MEDICINE

## 2024-08-27 PROCEDURE — 1036F TOBACCO NON-USER: CPT | Performed by: INTERNAL MEDICINE

## 2024-08-27 PROCEDURE — 1159F MED LIST DOCD IN RCRD: CPT | Performed by: INTERNAL MEDICINE

## 2024-08-27 ASSESSMENT — ENCOUNTER SYMPTOMS
COUGH: 0
WHEEZING: 0
FACIAL SWELLING: 0
SINUS PAIN: 0
SHORTNESS OF BREATH: 0

## 2024-08-27 NOTE — PATIENT INSTRUCTIONS
Complete chest x ray and call office for further advice, see ENT for removal of wax. Return here sooner than January as needed.

## 2024-08-27 NOTE — PROGRESS NOTES
"Subjective   Patient ID: Donna Cadet is a 79 y.o. female who presents for Follow-up (Follow up on covid-had it 2 weeks ago).abnormal x ray    HPI Pt had covid with dehydration that required visit to ER for IV fluids.While there she had x rays one view of chest that showed patchy interstitial changes in left lower lung.  She still feels tired and lost of taste.    Review of Systems   HENT:  Negative for ear pain, facial swelling and sinus pain.    Respiratory:  Negative for cough, shortness of breath and wheezing.    All other systems reviewed and are negative.      Objective   /78 (BP Location: Right arm, Patient Position: Sitting)   Pulse 71   Resp 18   Ht 1.702 m (5' 7\")   Wt 56 kg (123 lb 7.3 oz)   SpO2 97%   BMI 19.34 kg/m²     Physical Exam  Eyes- Conjunctiva clear  HEENT no sinus tenderness, impacted wax on left side, clear oropharynx,   Neck-no thyromegaly  Cardiac- regular rate and rhythm, no murmurs  Lung- clear to auscultation  GI- present  bowel sounds, nontender, no rebound  MSK- no deformities  Extremities- no edema, good distal pulses  Neuro- non focal, oriented x 3  Psychiatric- pleasant, well groomed, no hallucinations    Assessment/Plan   Problem List Items Addressed This Visit             ICD-10-CM    Impacted cerumen of left ear H61.22     Referral to ENT         Relevant Orders    Referral to ENT    Abnormal finding on lung imaging - Primary R91.8     Pt is claustrophobic, wants to start with x rays chest,          Relevant Orders    XR chest 2 views          "

## 2024-08-28 ENCOUNTER — HOSPITAL ENCOUNTER (OUTPATIENT)
Dept: RADIOLOGY | Facility: CLINIC | Age: 79
Discharge: HOME | End: 2024-08-28
Payer: MEDICARE

## 2024-08-28 DIAGNOSIS — R91.8 ABNORMAL FINDING ON LUNG IMAGING: ICD-10-CM

## 2024-08-28 PROCEDURE — 71046 X-RAY EXAM CHEST 2 VIEWS: CPT

## 2024-08-28 PROCEDURE — 71046 X-RAY EXAM CHEST 2 VIEWS: CPT | Performed by: RADIOLOGY

## 2024-08-29 ENCOUNTER — TELEPHONE (OUTPATIENT)
Dept: PRIMARY CARE | Facility: CLINIC | Age: 79
End: 2024-08-29
Payer: MEDICARE

## 2024-09-30 ENCOUNTER — TELEPHONE (OUTPATIENT)
Dept: ENDOCRINOLOGY | Facility: CLINIC | Age: 79
End: 2024-09-30

## 2024-10-08 ENCOUNTER — APPOINTMENT (OUTPATIENT)
Dept: ENDOCRINOLOGY | Facility: CLINIC | Age: 79
End: 2024-10-08
Payer: MEDICARE

## 2024-10-09 ENCOUNTER — APPOINTMENT (OUTPATIENT)
Dept: OTOLARYNGOLOGY | Facility: CLINIC | Age: 79
End: 2024-10-09
Payer: MEDICARE

## 2024-10-09 DIAGNOSIS — H61.22 IMPACTED CERUMEN OF LEFT EAR: ICD-10-CM

## 2024-10-09 DIAGNOSIS — H61.21 IMPACTED CERUMEN OF RIGHT EAR: Primary | ICD-10-CM

## 2024-10-09 PROCEDURE — 69210 REMOVE IMPACTED EAR WAX UNI: CPT | Performed by: OTOLARYNGOLOGY

## 2024-10-09 PROCEDURE — 1036F TOBACCO NON-USER: CPT | Performed by: OTOLARYNGOLOGY

## 2024-10-09 PROCEDURE — 1159F MED LIST DOCD IN RCRD: CPT | Performed by: OTOLARYNGOLOGY

## 2024-10-09 NOTE — PROGRESS NOTES
"History Of Present Illness  Donna Cadet is a 79 y.o. female presenting with: \"Earwax in left ear\".  On examination, there was earwax bilaterally.  More on the left side.  Cleaning was done.  Tympanic membranes look intact bilaterally.    Plan  1-follow-up as needed     Past Medical History  She has a past medical history of Age-related nuclear cataract of both eyes, Age-related osteoporosis without current pathological fracture (07/10/2013), Dry eye syndrome of bilateral lacrimal glands, History of hyperparathyroidism, MGUS (monoclonal gammopathy of unknown significance), Multiple thyroid nodules, Other conditions influencing health status, Other hemorrhoids (10/03/2017), Other specified disorders of temporomandibular joint (08/03/2017), Pyuria (03/25/2014), and Unspecified disorder of refraction.    Surgical History  She has a past surgical history that includes Other surgical history (04/20/2013); Colonoscopy (04/20/2013); Other surgical history (05/10/2018); and Lumbar fusion (01/05/2017).     Social History  She reports that she has never smoked. She has never used smokeless tobacco. She reports current alcohol use. She reports that she does not use drugs.    Family History  Family History   Problem Relation Name Age of Onset    Diabetes Father      Alzheimer's disease Father      Diabetes Sister          type 2        Allergies  Patient has no known allergies.    Review of Systems   No problems were reported     Physical Exam    General appearance: Healthy-appearing, well-nourished, well groomed, in no acute distress.     Head and Face: Atraumatic with no masses, lesions, or scarring.      Salivary glands: No tenderness of the parotid glands or parotid masses.     No tenderness of the submandibular glands or submandibular masses.      Facial strength: Normal strength and symmetry, no synkinesis or facial tic.     Eyes: Conjunctivas look non-hyperemic bilaterally    Ears: Bilaterally wax was cleaned, ear " "canals look normal. Tympanic membranes look intact, no hyperemia, fluid or retraction. Hearing grossly normal.      Nose: Mucosa looks normal. No purulent discharge.     Oral Cavity/Mouth: Lips and tongue look normal.     Throat: No postnasal discharge. No tonsil hypertrophy. No hyperemia.    Neck: Symmetrical, trachea midline.     Pulmonary: Normal respiratory effort.     Lymphatic: No palpable pathologic lymph nodes at neck.     Neurological/Psychiatric Orientation to person, place, and time: Normal.     Mood and affect: Normal.      Extremities: No clubbing.     Skin: No significant skin lesions were noted at face or neck        Procedure  EAR WAX REMOVAL (10.09.2024)  Patient had bilateral ear wax. Using small instrument(s) cleaning was done. Patient tolerated the procedure well.          Last Recorded Vitals  There were no vitals taken for this visit.    Relevant Results    Assessment and Plan:  Donna Cadet is a 79 y.o. female presenting with: \"Earwax in left ear\".  On examination, there was earwax bilaterally.  More on the left side.  Cleaning was done.  Tympanic membranes look intact bilaterally.    Plan  1-follow-up as needed    Saeid Rogel  Otolaryngology - Head & Neck Surgery  "

## 2024-10-10 DIAGNOSIS — E78.5 HYPERLIPIDEMIA, UNSPECIFIED: ICD-10-CM

## 2024-10-10 RX ORDER — SIMVASTATIN 10 MG/1
10 TABLET, FILM COATED ORAL NIGHTLY
Qty: 90 TABLET | Refills: 1 | Status: SHIPPED | OUTPATIENT
Start: 2024-10-10

## 2024-11-05 ENCOUNTER — APPOINTMENT (OUTPATIENT)
Dept: RHEUMATOLOGY | Facility: CLINIC | Age: 79
End: 2024-11-05
Payer: MEDICARE

## 2024-11-05 ENCOUNTER — LAB (OUTPATIENT)
Dept: LAB | Facility: LAB | Age: 79
End: 2024-11-05
Payer: MEDICARE

## 2024-11-05 DIAGNOSIS — M81.0 OSTEOPOROSIS, UNSPECIFIED OSTEOPOROSIS TYPE, UNSPECIFIED PATHOLOGICAL FRACTURE PRESENCE: ICD-10-CM

## 2024-11-05 DIAGNOSIS — Z79.899 ENCOUNTER FOR LONG-TERM (CURRENT) USE OF MEDICATIONS: ICD-10-CM

## 2024-11-05 DIAGNOSIS — E55.9 VITAMIN D DEFICIENCY: ICD-10-CM

## 2024-11-05 DIAGNOSIS — M35.00 SICCA, UNSPECIFIED TYPE (MULTI): ICD-10-CM

## 2024-11-05 LAB
25(OH)D3 SERPL-MCNC: 47 NG/ML (ref 30–100)
ALBUMIN SERPL BCP-MCNC: 4.1 G/DL (ref 3.4–5)
ALP SERPL-CCNC: 47 U/L (ref 33–136)
ALT SERPL W P-5'-P-CCNC: 16 U/L (ref 7–45)
ANION GAP SERPL CALC-SCNC: 13 MMOL/L (ref 10–20)
APPEARANCE UR: CLEAR
AST SERPL W P-5'-P-CCNC: 14 U/L (ref 9–39)
BASOPHILS # BLD AUTO: 0.02 X10*3/UL (ref 0–0.1)
BASOPHILS NFR BLD AUTO: 0.4 %
BILIRUB SERPL-MCNC: 0.5 MG/DL (ref 0–1.2)
BILIRUB UR STRIP.AUTO-MCNC: NEGATIVE MG/DL
BUN SERPL-MCNC: 25 MG/DL (ref 6–23)
CALCIUM SERPL-MCNC: 9.4 MG/DL (ref 8.6–10.6)
CHLORIDE SERPL-SCNC: 104 MMOL/L (ref 98–107)
CK SERPL-CCNC: 64 U/L (ref 0–215)
CO2 SERPL-SCNC: 30 MMOL/L (ref 21–32)
COLOR UR: ABNORMAL
CREAT SERPL-MCNC: 0.97 MG/DL (ref 0.5–1.05)
CREAT UR-MCNC: 125.8 MG/DL (ref 20–320)
CRP SERPL-MCNC: 0.17 MG/DL
EGFRCR SERPLBLD CKD-EPI 2021: 60 ML/MIN/1.73M*2
ENA SS-A AB SER IA-ACNC: <0.2 AI
ENA SS-B AB SER IA-ACNC: <0.2 AI
EOSINOPHIL # BLD AUTO: 0.16 X10*3/UL (ref 0–0.4)
EOSINOPHIL NFR BLD AUTO: 3.6 %
ERYTHROCYTE [DISTWIDTH] IN BLOOD BY AUTOMATED COUNT: 12.8 % (ref 11.5–14.5)
GLUCOSE SERPL-MCNC: 154 MG/DL (ref 74–99)
GLUCOSE UR STRIP.AUTO-MCNC: NORMAL MG/DL
HCT VFR BLD AUTO: 39.9 % (ref 36–46)
HGB BLD-MCNC: 12.3 G/DL (ref 12–16)
HOLD SPECIMEN: NORMAL
IMM GRANULOCYTES # BLD AUTO: 0.01 X10*3/UL (ref 0–0.5)
IMM GRANULOCYTES NFR BLD AUTO: 0.2 % (ref 0–0.9)
KETONES UR STRIP.AUTO-MCNC: NEGATIVE MG/DL
LEUKOCYTE ESTERASE UR QL STRIP.AUTO: ABNORMAL
LYMPHOCYTES # BLD AUTO: 0.94 X10*3/UL (ref 0.8–3)
LYMPHOCYTES NFR BLD AUTO: 21.1 %
MAGNESIUM SERPL-MCNC: 1.98 MG/DL (ref 1.6–2.4)
MCH RBC QN AUTO: 30.2 PG (ref 26–34)
MCHC RBC AUTO-ENTMCNC: 30.8 G/DL (ref 32–36)
MCV RBC AUTO: 98 FL (ref 80–100)
MICROALBUMIN UR-MCNC: 70.2 MG/L
MICROALBUMIN/CREAT UR: 55.8 UG/MG CREAT
MONOCYTES # BLD AUTO: 0.37 X10*3/UL (ref 0.05–0.8)
MONOCYTES NFR BLD AUTO: 8.3 %
MUCOUS THREADS #/AREA URNS AUTO: ABNORMAL /LPF
NEUTROPHILS # BLD AUTO: 2.95 X10*3/UL (ref 1.6–5.5)
NEUTROPHILS NFR BLD AUTO: 66.4 %
NITRITE UR QL STRIP.AUTO: NEGATIVE
NRBC BLD-RTO: 0 /100 WBCS (ref 0–0)
PH UR STRIP.AUTO: 5.5 [PH]
PHOSPHATE SERPL-MCNC: 3.9 MG/DL (ref 2.5–4.9)
PLATELET # BLD AUTO: 232 X10*3/UL (ref 150–450)
POTASSIUM SERPL-SCNC: 4.1 MMOL/L (ref 3.5–5.3)
PROT SERPL-MCNC: 6.4 G/DL (ref 6.4–8.2)
PROT UR STRIP.AUTO-MCNC: ABNORMAL MG/DL
PTH-INTACT SERPL-MCNC: 63.9 PG/ML (ref 18.5–88)
RBC # BLD AUTO: 4.07 X10*6/UL (ref 4–5.2)
RBC # UR STRIP.AUTO: ABNORMAL /UL
RBC #/AREA URNS AUTO: ABNORMAL /HPF
SODIUM SERPL-SCNC: 143 MMOL/L (ref 136–145)
SP GR UR STRIP.AUTO: 1.02
SQUAMOUS #/AREA URNS AUTO: ABNORMAL /HPF
TSH SERPL-ACNC: 2.63 MIU/L (ref 0.44–3.98)
UROBILINOGEN UR STRIP.AUTO-MCNC: NORMAL MG/DL
WBC # BLD AUTO: 4.5 X10*3/UL (ref 4.4–11.3)
WBC #/AREA URNS AUTO: ABNORMAL /HPF

## 2024-11-05 PROCEDURE — 86235 NUCLEAR ANTIGEN ANTIBODY: CPT

## 2024-11-05 PROCEDURE — 83970 ASSAY OF PARATHORMONE: CPT

## 2024-11-05 PROCEDURE — 85025 COMPLETE CBC W/AUTO DIFF WBC: CPT

## 2024-11-05 PROCEDURE — 82784 ASSAY IGA/IGD/IGG/IGM EACH: CPT | Performed by: INTERNAL MEDICINE

## 2024-11-05 PROCEDURE — 82043 UR ALBUMIN QUANTITATIVE: CPT

## 2024-11-05 PROCEDURE — 84100 ASSAY OF PHOSPHORUS: CPT

## 2024-11-05 PROCEDURE — 82306 VITAMIN D 25 HYDROXY: CPT

## 2024-11-05 PROCEDURE — 84443 ASSAY THYROID STIM HORMONE: CPT

## 2024-11-05 PROCEDURE — 82787 IGG 1 2 3 OR 4 EACH: CPT | Performed by: INTERNAL MEDICINE

## 2024-11-05 PROCEDURE — 87086 URINE CULTURE/COLONY COUNT: CPT

## 2024-11-05 PROCEDURE — 82652 VIT D 1 25-DIHYDROXY: CPT

## 2024-11-05 PROCEDURE — 83735 ASSAY OF MAGNESIUM: CPT

## 2024-11-05 PROCEDURE — 86140 C-REACTIVE PROTEIN: CPT

## 2024-11-05 PROCEDURE — 81001 URINALYSIS AUTO W/SCOPE: CPT

## 2024-11-05 PROCEDURE — 36415 COLL VENOUS BLD VENIPUNCTURE: CPT

## 2024-11-05 PROCEDURE — 82550 ASSAY OF CK (CPK): CPT

## 2024-11-05 PROCEDURE — 82570 ASSAY OF URINE CREATININE: CPT

## 2024-11-05 PROCEDURE — 80053 COMPREHEN METABOLIC PANEL: CPT

## 2024-11-05 PROCEDURE — 82523 COLLAGEN CROSSLINKS: CPT

## 2024-11-06 LAB — BACTERIA UR CULT: NORMAL

## 2024-11-07 ENCOUNTER — OFFICE VISIT (OUTPATIENT)
Dept: RHEUMATOLOGY | Facility: CLINIC | Age: 79
End: 2024-11-07
Payer: MEDICARE

## 2024-11-07 VITALS
DIASTOLIC BLOOD PRESSURE: 61 MMHG | SYSTOLIC BLOOD PRESSURE: 131 MMHG | OXYGEN SATURATION: 97 % | BODY MASS INDEX: 19.7 KG/M2 | HEART RATE: 69 BPM | WEIGHT: 125.8 LBS

## 2024-11-07 DIAGNOSIS — E55.9 VITAMIN D DEFICIENCY: ICD-10-CM

## 2024-11-07 DIAGNOSIS — M70.61 GREATER TROCHANTERIC BURSITIS OF RIGHT HIP: ICD-10-CM

## 2024-11-07 DIAGNOSIS — D47.2 MGUS (MONOCLONAL GAMMOPATHY OF UNKNOWN SIGNIFICANCE): ICD-10-CM

## 2024-11-07 DIAGNOSIS — Z79.899 ENCOUNTER FOR LONG-TERM (CURRENT) USE OF MEDICATIONS: ICD-10-CM

## 2024-11-07 DIAGNOSIS — M81.0 OSTEOPOROSIS WITHOUT CURRENT PATHOLOGICAL FRACTURE, UNSPECIFIED OSTEOPOROSIS TYPE: Primary | ICD-10-CM

## 2024-11-07 DIAGNOSIS — M35.00 SICCA, UNSPECIFIED TYPE (MULTI): ICD-10-CM

## 2024-11-07 LAB — 1,25(OH)2D SERPL-MCNC: 57.1 PG/ML (ref 19.9–79.3)

## 2024-11-07 PROCEDURE — 1159F MED LIST DOCD IN RCRD: CPT | Performed by: INTERNAL MEDICINE

## 2024-11-07 PROCEDURE — 2500000004 HC RX 250 GENERAL PHARMACY W/ HCPCS (ALT 636 FOR OP/ED): Mod: JZ | Performed by: INTERNAL MEDICINE

## 2024-11-07 PROCEDURE — 96372 THER/PROPH/DIAG INJ SC/IM: CPT | Performed by: INTERNAL MEDICINE

## 2024-11-07 PROCEDURE — 99212 OFFICE O/P EST SF 10 MIN: CPT | Performed by: INTERNAL MEDICINE

## 2024-11-07 PROCEDURE — 1125F AMNT PAIN NOTED PAIN PRSNT: CPT | Performed by: INTERNAL MEDICINE

## 2024-11-07 PROCEDURE — 1036F TOBACCO NON-USER: CPT | Performed by: INTERNAL MEDICINE

## 2024-11-07 PROCEDURE — 1160F RVW MEDS BY RX/DR IN RCRD: CPT | Performed by: INTERNAL MEDICINE

## 2024-11-07 ASSESSMENT — ROUTINE ASSESSMENT OF PATIENT INDEX DATA (RAPID3)
ON A SCALE OF ONE TO TEN, HOW MUCH PAIN HAVE YOU HAD BECAUSE OF YOUR CONDITION OVER THE PAST WEEK?: 0
GOOD_NIGHTS_SLEEP: WITH SOME DIFFICULTY
PARTIPATE_RECREATIONAL_ACTIVITIES: WITHOUT ANY DIFFICULTY
SEVERITY_SCORE: 0
ON A SCALE OF ONE TO TEN, CONSIDERING ALL THE WAYS IN WHICH ILLNESS AND HEALTH CONDITIONS MAY AFFECT YOU AT THIS TIME, PLEASE INDICATE BELOW HOW YOU ARE DOING:: 0
TOTAL RAPID3 SCORE: 0.3
SUM OF QUESTIONS A TO J: 1
PICK_CLOTHES_OFF_FLOOR: WITHOUT ANY DIFFICULTY
WALK_FLAT_GROUND: WITHOUT ANY DIFFICULTY
WALK_KILOMETERS: WITHOUT ANY DIFFICULTY
IN_OUT_BED: WITHOUT ANY DIFFICULTY
WASH_DRY_BODY: WITHOUT ANY DIFFICULTY
ON A SCALE OF ONE TO TEN, CONSIDERING ALL THE WAYS IN WHICH ILLNESS AND HEALTH CONDITIONS MAY AFFECT YOU AT THIS TIME, PLEASE INDICATE BELOW HOW YOU ARE DOING:: 0
IN_OUT_TRANSPORT: WITH SOME DIFFICULTY
ON A SCALE OF ONE TO TEN, HOW MUCH PAIN HAVE YOU HAD BECAUSE OF YOUR CONDITION OVER THE PAST WEEK?: 0
FEELINGS_ANXIETY_NERVOUS: WITHOUT ANY DIFFICULTY
FEELINGS_DEPRESSION: WITHOUT ANY DIFFICULTY
FN_SCORE: 0.3
DRESS_YOURSELF: WITHOUT ANY DIFFICULTY
SEVERITY_SCORE: NEAR REMISSION (NR)
LIFT_CUP_TO_MOUTH: WITHOUT ANY DIFFICULTY
WEIGHTED_TOTAL_SCORE: 0.1
TURN_FAUCETS_OFF: WITHOUT ANY DIFFICULTY

## 2024-11-07 ASSESSMENT — PAIN SCALES - GENERAL: PAINLEVEL_OUTOF10: 3

## 2024-11-07 ASSESSMENT — ENCOUNTER SYMPTOMS
OCCASIONAL FEELINGS OF UNSTEADINESS: 0
DEPRESSION: 0
LOSS OF SENSATION IN FEET: 0

## 2024-11-07 ASSESSMENT — PATIENT HEALTH QUESTIONNAIRE - PHQ9
SUM OF ALL RESPONSES TO PHQ9 QUESTIONS 1 AND 2: 0
2. FEELING DOWN, DEPRESSED OR HOPELESS: NOT AT ALL
1. LITTLE INTEREST OR PLEASURE IN DOING THINGS: NOT AT ALL

## 2024-11-07 NOTE — PROGRESS NOTES
[facility]/  Rheumatology  [facility], [address]  [place] [address]  Phone: [phone]  Fax: [phone]    Rheumatology Follow Up Visit 11/07/2024      Referred by: Dr García for   Chief Complaint   Patient presents with    Follow-up     And prolia       Donna Cadet is a 79 y.o. female here for continuing osteoporosis tx with Prolia.     Last Visit: 6/1/24    HPI  79 y/o female with hx of   Hyperlipidemia, prediabetes, osteoporosis without know fragility fractures, MGUS, C-spine surgery, lumbar fusion, hyperPTH, s/p PTH gland resection 2008, thyroid nodules.  Previous pt of Dr García for osteoporosis.  Has done well with Prolia and is due her injection  and will be getting today.   Denies any jaw, joint or bone pain, fractures or falls.   On calcium and vit D    ROS + dry eyes- also noted by Optha and advised to use lubricating eye drops, non significant sandhya cataracts    Rheum Hx  Prolia      Health Maintenance:  DXA T -2.9 (hip; 1/22) -3.2 (forearm); FRAX 19%/7.6%  Malignancy Hx- none  Immunization History   Administered Date(s) Administered    Flu vaccine, quadrivalent, high-dose, preservative free, age 65y+ (FLUZONE) 10/26/2020, 10/19/2022    Flu vaccine, trivalent, preservative free, HIGH-DOSE, age 65y+ (Fluzone) 11/29/2017, 10/16/2018, 10/01/2019, 09/25/2020    Influenza, Unspecified 09/22/2009, 09/02/2011, 10/31/2021    Influenza, seasonal, injectable 12/09/2016    Moderna SARS-CoV-2 Vaccination 01/25/2021, 02/22/2021, 11/06/2021    Pfizer COVID-19 vaccine, bivalent, age 12 years and older (30 mcg/0.3 mL) 12/09/2022    Pneumococcal conjugate vaccine, 13-valent (PREVNAR 13) 10/13/2015    RESPIRATORY SYNCYTIAL VIRUS (RSV), ELIGIBLE PREGNANT PTS, 0.5 ML (ABRYSVO) 12/04/2023    Tdap vaccine, age 7 year and older (BOOSTRIX, ADACEL) 09/02/2011, 07/28/2021    Zoster vaccine, recombinant, adult (SHINGRIX) 10/01/2020    Zoster, Unspecified 10/01/2020          Past Medical  History:   Diagnosis Date    Age-related nuclear cataract of both eyes     Age-related osteoporosis without current pathological fracture 07/10/2013    Osteoporosis    Dry eye syndrome of bilateral lacrimal glands     History of hyperparathyroidism     s/p resection    MGUS (monoclonal gammopathy of unknown significance)     Multiple thyroid nodules     Other conditions influencing health status     Bone Density Studies Dual-Energy X-ray Absorptiometry    Other hemorrhoids 10/03/2017    Bleeding internal hemorrhoids    Other specified disorders of temporomandibular joint 08/03/2017    TMJ inflammation    Pyuria 03/25/2014    Pyuria    Unspecified disorder of refraction       Past Surgical History:   Procedure Laterality Date    COLONOSCOPY  04/20/2013    Complete Colonoscopy    LUMBAR FUSION  01/05/2017    Lumbar Vertebral Fusion    OTHER SURGICAL HISTORY  04/20/2013    Parathyroid Complete Parathyroidectomy    OTHER SURGICAL HISTORY  05/10/2018    Vertebral Body Resection Cervical Segment      Current Outpatient Medications   Medication Sig Dispense Refill    calcium carbonate (TUMS ORAL) Take by mouth.      cholecalciferol (Vitamin D-3) 50,000 unit capsule Once a month 12 capsule 1    polyethylene glycol (Miralax) 17 gram/dose powder Mix 17 g of powder and drink once daily.      simvastatin (Zocor) 10 mg tablet TAKE 1 TABLET (10 MG) BY MOUTH ONCE DAILY AT BEDTIME. 90 tablet 1     Current Facility-Administered Medications   Medication Dose Route Frequency Provider Last Rate Last Admin    denosumab (Prolia) injection 60 mg  60 mg subcutaneous q6 months Alma Delia Mike MD          No Known Allergies   Visit Vitals  /61   Pulse 69   Wt 57.1 kg (125 lb 12.8 oz)   SpO2 97%   BMI 19.70 kg/m²   Smoking Status Never   BSA 1.64 m²            Rapid 3  Function Score (FN): 0.3  Pain Score (PN) (0-10): 0  Patient Global (PTGL) (0-10): 0  Rapid3 Score: 0.3  RAPID3 Weighted Score: 0.1    Component      Latest Ref  [facility] 11/5/2024   WBC      4.4 - 11.3 x10*3/uL 4.5    nRBC      0.0 - 0.0 /100 WBCs 0.0    RBC      4.00 - 5.20 x10*6/uL 4.07    HEMOGLOBIN      12.0 - 16.0 g/dL 12.3    HEMATOCRIT      36.0 - 46.0 % 39.9    MCV      80 - 100 fL 98    MCH      26.0 - 34.0 pg 30.2    MCHC      32.0 - 36.0 g/dL 30.8 (L)    RED CELL DISTRIBUTION WIDTH      11.5 - 14.5 % 12.8    Platelets      150 - 450 x10*3/uL 232    Neutrophils %      40.0 - 80.0 % 66.4    Immature Granulocytes %, Automated      0.0 - 0.9 % 0.2    Lymphocytes %      13.0 - 44.0 % 21.1    Monocytes %      2.0 - 10.0 % 8.3    Eosinophils %      0.0 - 6.0 % 3.6    Basophils %      0.0 - 2.0 % 0.4    Neutrophils Absolute      1.60 - 5.50 x10*3/uL 2.95    Immature Granulocytes Absolute, Automated      0.00 - 0.50 x10*3/uL 0.01    Lymphocytes Absolute      0.80 - 3.00 x10*3/uL 0.94    Monocytes Absolute      0.05 - 0.80 x10*3/uL 0.37    Eosinophils Absolute      0.00 - 0.40 x10*3/uL 0.16    Basophils Absolute      0.00 - 0.10 x10*3/uL 0.02    GLUCOSE      74 - 99 mg/dL 154 (H)    SODIUM      136 - 145 mmol/L 143    POTASSIUM      3.5 - 5.3 mmol/L 4.1    CHLORIDE      98 - 107 mmol/L 104    Bicarbonate      21 - 32 mmol/L 30    Anion Gap      10 - 20 mmol/L 13    Blood Urea Nitrogen      6 - 23 mg/dL 25 (H)    Creatinine      0.50 - 1.05 mg/dL 0.97    EGFR      >60 mL/min/1.73m*2 60 (L)    Calcium      8.6 - 10.6 mg/dL 9.4    Albumin      3.4 - 5.0 g/dL 4.1    Alkaline Phosphatase      33 - 136 U/L 47    Total Protein      6.4 - 8.2 g/dL 6.4    AST      9 - 39 U/L 14    Bilirubin Total      0.0 - 1.2 mg/dL 0.5    ALT      7 - 45 U/L 16    Color, Urine      Light-Yellow, Yellow, Dark-Yellow  Light-Yellow    Appearance, Urine      Clear  Clear    Specific Gravity, Urine      1.005 - 1.035  1.023    pH, Urine      5.0, 5.5, 6.0, 6.5, 7.0, 7.5, 8.0  5.5    Protein, Urine      NEGATIVE, 10 (TRACE), 20 (TRACE) mg/dL 10 (TRACE)    Glucose, Urine      Normal mg/dL Normal    Blood,  Urine      NEGATIVE  0.03 (TRACE) !    Ketones, Urine      NEGATIVE mg/dL NEGATIVE    Bilirubin, Urine      NEGATIVE  NEGATIVE    Urobilinogen, Urine      Normal mg/dL Normal    Nitrite, Urine      NEGATIVE  NEGATIVE    Leukocyte Esterase, Urine      NEGATIVE  250 Urmila/µL !    WBC, Urine      1-5, NONE /HPF 21-50 !    RBC, Urine      NONE, 1-2, 3-5 /HPF 6-10 !    Squamous Epithelial Cells, Urine      Reference range not established. /HPF 1-9 (SPARSE)    Mucus, Urine      Reference range not established. /LPF 2+    Albumin, Urine Random      Not established mg/L 70.2    Creatinine, Urine Random      20.0 - 320.0 mg/dL 125.8    Albumin/Creatinine Ratio      <30.0 ug/mg Creat 55.8 (H)    C-Reactive Protein      <1.00 mg/dL 0.17    Creatine Kinase      0 - 215 U/L 64    Parathyroid Hormone, Intact      18.5 - 88.0 pg/mL 63.9    PHOSPHORUS      2.5 - 4.9 mg/dL 3.9    MAGNESIUM      1.60 - 2.40 mg/dL 1.98    Vitamin D, 25-Hydroxy, Total      30 - 100 ng/mL 47    Thyroid Stimulating Hormone      0.44 - 3.98 mIU/L 2.63    Anti-SSB      <1.0 AI <0.2    Anti-SSA      <1.0 AI <0.2    Extra Tube Hold for add-ons.       1. Osteoporosis without current pathological fracture, unspecified osteoporosis type  C-Telopeptide, Beta Cross Linked    CBC and Auto Differential    Creatine Kinase    Comprehensive Metabolic Panel    Phosphorus    Parathyroid Hormone, Intact    Urinalysis with Reflex Culture and Microscopic    Magnesium    Vitamin D 25-Hydroxy,Total (for eval of Vitamin D levels)    denosumab (Prolia) injection 60 mg      2. Encounter for long-term (current) use of medications  C-Telopeptide, Beta Cross Linked    CBC and Auto Differential    Creatine Kinase    Comprehensive Metabolic Panel    Phosphorus    Parathyroid Hormone, Intact    Urinalysis with Reflex Culture and Microscopic    Magnesium    Vitamin D 25-Hydroxy,Total (for eval of Vitamin D levels)      3. Vitamin D deficiency  C-Telopeptide, Beta Cross Linked    CBC and  Auto Differential    Creatine Kinase    Comprehensive Metabolic Panel    Phosphorus    Parathyroid Hormone, Intact    Urinalysis with Reflex Culture and Microscopic    Magnesium    Vitamin D 25-Hydroxy,Total (for eval of Vitamin D levels)      4. Sicca, unspecified type (Multi)        5. MGUS (monoclonal gammopathy of unknown significance)        6. Greater trochanteric bursitis of right hip               Since last appt, adherent and tolerating Prolia-  Last Prolia 5/5/24  Taking calcium and vit D.  Active  Denies any recent or current infection.  Not on any NSAIDs or glucocorticoids.  ROS+ for joint pain- right hip comes and goes; TTP over greater trochanter, no hip tenderness with rotation  Labs and DXA reviewed  D/w pt tx options and decided on   Continue Prolia- doing well  Exercises/stretches and supportive care for trochanteric bursitis.  Heel strikes, balance exercises, and low weight bearing activity recommended.   Recommend calcium 1200 mg daily, preferably from diet, and vit D at least 1000 units daily.   Advised of possible side effects and importance of monitoring.   All questions answered.  Patient to follow up with primary care provider regarding all other medical issues not addressed today and for medical chart updating.     Alma Delia Mike MD      Patient Care Team:  Kalra Justice MD as PCP - General (Internal Medicine)  Karla Justice MD as PCP - [facility]ributed Provider  Alma Delia Mike MD as Consulting Physician (Rheumatology)

## 2024-11-08 LAB — COLLAGEN CTX SERPL-MCNC: 65 PG/ML

## 2024-11-11 ENCOUNTER — TELEPHONE (OUTPATIENT)
Dept: PRIMARY CARE | Facility: CLINIC | Age: 79
End: 2024-11-11
Payer: MEDICARE

## 2024-11-15 LAB — SCAN RESULT: ABNORMAL

## 2025-01-09 ENCOUNTER — APPOINTMENT (OUTPATIENT)
Dept: PRIMARY CARE | Facility: CLINIC | Age: 80
End: 2025-01-09
Payer: MEDICARE

## 2025-01-09 VITALS
OXYGEN SATURATION: 98 % | DIASTOLIC BLOOD PRESSURE: 62 MMHG | HEIGHT: 67 IN | WEIGHT: 126.98 LBS | SYSTOLIC BLOOD PRESSURE: 108 MMHG | BODY MASS INDEX: 19.93 KG/M2 | RESPIRATION RATE: 16 BRPM

## 2025-01-09 DIAGNOSIS — F51.01 PRIMARY INSOMNIA: ICD-10-CM

## 2025-01-09 DIAGNOSIS — E78.00 PURE HYPERCHOLESTEROLEMIA: Primary | ICD-10-CM

## 2025-01-09 DIAGNOSIS — R73.9 HYPERGLYCEMIA: ICD-10-CM

## 2025-01-09 PROCEDURE — 99213 OFFICE O/P EST LOW 20 MIN: CPT | Performed by: INTERNAL MEDICINE

## 2025-01-09 PROCEDURE — 1123F ACP DISCUSS/DSCN MKR DOCD: CPT | Performed by: INTERNAL MEDICINE

## 2025-01-09 PROCEDURE — 1159F MED LIST DOCD IN RCRD: CPT | Performed by: INTERNAL MEDICINE

## 2025-01-09 PROCEDURE — 1158F ADVNC CARE PLAN TLK DOCD: CPT | Performed by: INTERNAL MEDICINE

## 2025-01-09 PROCEDURE — G2211 COMPLEX E/M VISIT ADD ON: HCPCS | Performed by: INTERNAL MEDICINE

## 2025-01-09 PROCEDURE — 1036F TOBACCO NON-USER: CPT | Performed by: INTERNAL MEDICINE

## 2025-01-09 PROCEDURE — 1160F RVW MEDS BY RX/DR IN RCRD: CPT | Performed by: INTERNAL MEDICINE

## 2025-01-09 ASSESSMENT — ENCOUNTER SYMPTOMS
DEPRESSION: 0
LOSS OF SENSATION IN FEET: 0
OCCASIONAL FEELINGS OF UNSTEADINESS: 0

## 2025-01-09 NOTE — PATIENT INSTRUCTIONS
Drink plenty during the day , total of 50 oz, but decrease to only necessary fluids after 6 pm. If you drink alcohol, don't use it close to bed time. Avoid media, phone, TV close to bed time. Use white noise and dark room to sleep. Start  3 units of Melatonin between 30 to 60 min prior to bed time, and be consistent with your bed time. You may see results after 3 weeks of taking this natural supplement. You may increase to 6 units melatonine if not yet effective. Continue current medications. In your diet avoid excessive carbs. Return in July for wellness

## 2025-01-09 NOTE — ASSESSMENT & PLAN NOTE
Labs stable, discussed diet,    Physical Therapy  Evaluation    Khloe Kendrick   MRN: [id number]   Admitting Diagnosis: Pyogenic arthritis of right knee joint    PT Received On: 04/22/17  PT Start Time: 1145     PT Stop Time: 1205    PT Total Time (min): 20 min       Billable Minutes:  Evaluation 20 (Co-Eval with OT)    Diagnosis: Pyogenic arthritis of right knee joint      Past Medical History:   Diagnosis Date    AMD (age-related macular degeneration), bilateral 6/10/2016    Cataract     Coronary artery disease     Diabetes mellitus, type 2     Hyperlipidemia     Hypertension     Hyperthyroidism 8/20/2015    Osteoarthritis     Peripheral arterial disease 4/3/2013      Past Surgical History:   Procedure Laterality Date    ABDOMINAL SURGERY      APPENDECTOMY      CARDIAC SURGERY  2008    CABG    CATARACT EXTRACTION Right     CATARACT EXTRACTION W/  INTRAOCULAR LENS IMPLANT  8/13/2015    Dr. Blount ( [facility])    EYE SURGERY  2011    Rt eye cataract    HERNIA REPAIR      HYSTERECTOMY      HYSTEROTOMY N/A 34 yrs old       Referring physician: Dr. Collins  Date referred to PT: 04/22/17    General Precautions: Standard, fall  Orthopedic Precautions: Full weight bearing          Patient History:  Lives With: child(ni), adult (son lives in her home)  Living Arrangements: house  Home Accessibility: stairs to enter home  Number of Stairs to Enter Home: 4  Stair Railings at Home: none  Transportation Available: family or friend will provide  Equipment Currently Used at Home: none  DME owned (not currently used): none    Previous Level of Function:  Ambulation Skills: independent  Transfer Skills: independent  ADL Skills: independent    Subjective:  Communicated with nurse prior to session.    Chief Complaint: Pain (R) knee.   Patient goals: To go home.    Pain Rating:  (Pt yelled with movement but unable to rate pain)   Location - Side: Right     Location: leg  Pain Addressed: Reposition             Cognitive Exam:  Oriented to: Person,  Place, Time and Situation    Follows Commands/attention: Follows two-step commands  Communication: clear/fluent  Safety awareness/insight to disability: intact    Physical Exam:      Skin integrity: Wound (R) knee covered and ace wrapped.  Edema: Moderate     Sensation:  NT        Lower Extremity Range of Motion:  Right Lower Extremity: Deficits: decreased knee flex/ext; pain with movement.  Left Lower Extremity: WFL    Lower Extremity Strength:  Right Lower Extremity: decreased however, unable to MMT  Left Lower Extremity: WFL           Functional Mobility:  Bed Mobility:  Supine to Sit: Minimum Assistance    Transfers:  Sit <> Stand Assistance: Minimum Assistance  Sit <> Stand Assistive Device: Rolling Walker    Gait:   Gait Distance: 50  Assistance 1: Contact Guard Assistance  Gait Assistive Device: Rolling walker  Gait Deviation(s): forward lean (antalgic)    Stairs: NT      Balance:   Static Sit: Fair  Dynamic Sit: NT  Static Stand: Fair  Dynamic stand: Fair    AM-PAC 6 CLICK MOBILITY  How much help from another person does this patient currently need?   1 = Unable, Total/Dependent Assistance  2 = A lot, Maximum/Moderate Assistance  3 = A little, Minimum/Contact Guard/Supervision  4 = None, Modified [place]/Independent    Turning over in bed (including adjusting bedclothes, sheets and blankets)?: 3  Sitting down on and standing up from a chair with arms (e.g., wheelchair, bedside commode, etc.): 3  Moving from lying on back to sitting on the side of the bed?: 3  Moving to and from a bed to a chair (including a wheelchair)?: 3  Need to walk in hospital room?: 3  Climbing 3-5 steps with a railing?: 2  Total Score: 17     AM-PAC Raw Score CMS G-Code Modifier Level of Impairment Assistance   6 % Total / Unable   7 - 9 CM 80 - 100% Maximal Assist   10 - 14 CL 60 - 80% Moderate Assist   15 - 19 CK 40 - 60% Moderate Assist   20 - 22 CJ 20 - 40% Minimal Assist   23 CI 1-20% SBA / CGA   24 CH 0%  Independent/ Mod I     Patient left in recliner with family present. present.    Assessment:   Khloe Kendrick is a 77 y.o. female with a medical diagnosis of Pyogenic arthritis of right knee joint and presents with decreased ROM/strength (R) knee and decreased mobility.    Rehab identified problem list/impairments: Rehab identified problem list/impairments: impaired functional mobilty, impaired balance, gait instability, decreased safety awareness, pain, decreased ROM    Rehab potential is good.    Activity tolerance: Good    Discharge recommendations: Discharge Facility/Level Of Care Needs: home health PT     Barriers to discharge: Barriers to Discharge: None    Equipment recommendations: Equipment Needed After Discharge: walker, rolling     GOALS:   Physical Therapy Goals        Problem: Physical Therapy Goal    Goal Priority Disciplines Outcome Goal Variances Interventions   Physical Therapy Goal     PT/OT, PT Ongoing (interventions implemented as appropriate)     Description:  Goals to be met by: 17.     Patient will increase functional independence with mobility by performin. Supine <>sit with Stand-by Assistance.  2. Transfer bed <> chair with SBA.   3. Ambulate 50' w/RW SBA.                PLAN:    Patient to be seen daily to address the above listed problems via gait training, therapeutic activities  Plan of Care expires: 17  Plan of Care reviewed with: patient, daughter          Terinne G Coleman, PT  2017

## 2025-01-09 NOTE — PROGRESS NOTES
"Subjective   Patient ID: Donna Cadet is a 79 y.o. female who presents for Hyperlipidemia. Discussed labs and consult.    HPI She continues walking indoors, diet is very similar year around.   Pt had consult with rheum and prolia injection in November, we discussed creatinine and GFR that fluctuates related to her hydration. She drinks about 10 oz water/day.  Her hmga1c  has been stable, and LDL on goal.  She has insomnia to start or maintain sleep, about 4 x week, on going for long period. She has used benadryl with some effect. Wants to discuss melatonine.       Review of Systems   Musculoskeletal:         Currently flare of right hip bursitis, unable to sleep on that side, however resolves usually within 2 weeks and prefers any intervention.   All other systems reviewed and are negative.      Objective   /62   Resp 16   Ht 1.702 m (5' 7\")   Wt 57.6 kg (126 lb 15.8 oz)   SpO2 98%   BMI 19.89 kg/m²     Physical Exam  Weight stable  Eyes- Conjunctiva clear  Neck- thyromegaly  Cardiac- regular rate and rhythm, no murmurs  Lung- clear to auscultation  GI- present  bowel sounds, nontender, no rebound  MSK- no deformities  Extremities- no edema, good distal pulses  Neuro- non focal, oriented x 3  Psychiatric- pleasant, well groomed, no hallucinations    Assessment/Plan   Problem List Items Addressed This Visit             ICD-10-CM    Hyperlipidemia, unspecified - Primary E78.5     On goal , continue same statin           Hyperglycemia R73.9     Labs stable, discussed diet,          Insomnia G47.00     Discussed melatonine, consider sleep aid ( trazadone ) in future               "

## 2025-01-17 ENCOUNTER — OFFICE VISIT (OUTPATIENT)
Dept: PRIMARY CARE | Facility: CLINIC | Age: 80
End: 2025-01-17
Payer: MEDICARE

## 2025-01-17 VITALS
DIASTOLIC BLOOD PRESSURE: 86 MMHG | WEIGHT: 126 LBS | RESPIRATION RATE: 16 BRPM | OXYGEN SATURATION: 98 % | HEART RATE: 77 BPM | BODY MASS INDEX: 19.73 KG/M2 | SYSTOLIC BLOOD PRESSURE: 130 MMHG

## 2025-01-17 DIAGNOSIS — R39.9 UTI SYMPTOMS: ICD-10-CM

## 2025-01-17 DIAGNOSIS — F51.01 PRIMARY INSOMNIA: Primary | ICD-10-CM

## 2025-01-17 LAB
POC APPEARANCE, URINE: CLEAR
POC BILIRUBIN, URINE: NEGATIVE
POC BLOOD, URINE: ABNORMAL
POC COLOR, URINE: YELLOW
POC GLUCOSE, URINE: NEGATIVE MG/DL
POC KETONES, URINE: NEGATIVE MG/DL
POC LEUKOCYTES, URINE: ABNORMAL
POC NITRITE,URINE: NEGATIVE
POC PH, URINE: 6 PH
POC PROTEIN, URINE: NEGATIVE MG/DL
POC SPECIFIC GRAVITY, URINE: 1.02
POC UROBILINOGEN, URINE: 0.2 EU/DL

## 2025-01-17 PROCEDURE — 87186 SC STD MICRODIL/AGAR DIL: CPT

## 2025-01-17 PROCEDURE — 1123F ACP DISCUSS/DSCN MKR DOCD: CPT | Performed by: INTERNAL MEDICINE

## 2025-01-17 PROCEDURE — G2211 COMPLEX E/M VISIT ADD ON: HCPCS | Performed by: INTERNAL MEDICINE

## 2025-01-17 PROCEDURE — 1036F TOBACCO NON-USER: CPT | Performed by: INTERNAL MEDICINE

## 2025-01-17 PROCEDURE — 99213 OFFICE O/P EST LOW 20 MIN: CPT | Performed by: INTERNAL MEDICINE

## 2025-01-17 PROCEDURE — 1159F MED LIST DOCD IN RCRD: CPT | Performed by: INTERNAL MEDICINE

## 2025-01-17 PROCEDURE — 87086 URINE CULTURE/COLONY COUNT: CPT

## 2025-01-17 PROCEDURE — 81003 URINALYSIS AUTO W/O SCOPE: CPT | Performed by: INTERNAL MEDICINE

## 2025-01-17 ASSESSMENT — ENCOUNTER SYMPTOMS
LOSS OF SENSATION IN FEET: 0
DEPRESSION: 0
FEVER: 0
OCCASIONAL FEELINGS OF UNSTEADINESS: 0
FATIGUE: 0
CHILLS: 0

## 2025-01-17 NOTE — PROGRESS NOTES
Subjective   Patient ID: Donna Cadet is a 79 y.o. female who presents for Urinary Symptom.    HPI In last few weeks after a friend learn she had asymptomatic UTI, pt started feeling pelvic pressure, denies dysuria, polkiuria, odor, ocassionally strong color, she admits low hydration, about 16 oz water/day    Review of Systems   Constitutional:  Negative for chills, fatigue and fever.   All other systems reviewed and are negative.      Objective   /86 (BP Location: Left arm, Patient Position: Sitting)   Pulse 77   Resp 16   Wt 57.2 kg (126 lb)   SpO2 98%   BMI 19.73 kg/m²     Physical Exam  Eyes- Conjunctiva clear  Neck-no thyromegaly  Cardiac- regular rate and rhythm, no murmurs  Lung- clear to auscultation  GI- present  bowel sounds, nontender, no rebound  MSK- no deformities  Extremities- no edema, good distal pulses  Neuro- non focal, oriented x 3  Psychiatric- pleasant, well groomed, no hallucinations    Assessment/Plan   Problem List Items Addressed This Visit             ICD-10-CM    Insomnia - Primary G47.00     She will hold off otc or meds for now         UTI symptoms R39.9     Urine culture pending, no antibiotics unless positive         Relevant Orders    POCT UA Automated manually resulted (Completed)

## 2025-01-19 LAB
BACTERIA UR CULT: ABNORMAL
BACTERIA UR CULT: ABNORMAL

## 2025-01-20 ENCOUNTER — APPOINTMENT (OUTPATIENT)
Dept: ENDOCRINOLOGY | Facility: CLINIC | Age: 80
End: 2025-01-20
Payer: MEDICARE

## 2025-01-20 ENCOUNTER — TELEPHONE (OUTPATIENT)
Dept: PRIMARY CARE | Facility: CLINIC | Age: 80
End: 2025-01-20

## 2025-01-20 DIAGNOSIS — N30.00 ACUTE CYSTITIS WITHOUT HEMATURIA: Primary | ICD-10-CM

## 2025-01-20 LAB
BACTERIA UR CULT: ABNORMAL
BACTERIA UR CULT: ABNORMAL

## 2025-01-20 RX ORDER — CIPROFLOXACIN 500 MG/1
TABLET ORAL
Qty: 14 TABLET | Refills: 0 | Status: SHIPPED | OUTPATIENT
Start: 2025-01-20

## 2025-01-28 DIAGNOSIS — R39.9 UTI SYMPTOMS: ICD-10-CM

## 2025-02-01 LAB — BACTERIA UR CULT: NORMAL

## 2025-03-04 ENCOUNTER — APPOINTMENT (OUTPATIENT)
Dept: ENDOCRINOLOGY | Facility: CLINIC | Age: 80
End: 2025-03-04
Payer: MEDICARE

## 2025-03-04 ENCOUNTER — TELEPHONE (OUTPATIENT)
Dept: PRIMARY CARE | Facility: CLINIC | Age: 80
End: 2025-03-04

## 2025-03-04 ENCOUNTER — OFFICE VISIT (OUTPATIENT)
Dept: PRIMARY CARE | Facility: CLINIC | Age: 80
End: 2025-03-04
Payer: MEDICARE

## 2025-03-04 VITALS
TEMPERATURE: 96.8 F | DIASTOLIC BLOOD PRESSURE: 60 MMHG | BODY MASS INDEX: 20.1 KG/M2 | RESPIRATION RATE: 16 BRPM | HEIGHT: 67 IN | OXYGEN SATURATION: 98 % | HEART RATE: 66 BPM | WEIGHT: 128.09 LBS | SYSTOLIC BLOOD PRESSURE: 128 MMHG

## 2025-03-04 VITALS — DIASTOLIC BLOOD PRESSURE: 68 MMHG | BODY MASS INDEX: 20.05 KG/M2 | WEIGHT: 128 LBS | SYSTOLIC BLOOD PRESSURE: 110 MMHG

## 2025-03-04 DIAGNOSIS — E55.9 VITAMIN D DEFICIENCY: ICD-10-CM

## 2025-03-04 DIAGNOSIS — E04.1 SOLITARY THYROID NODULE: Primary | ICD-10-CM

## 2025-03-04 DIAGNOSIS — R31.29 MICROSCOPIC HEMATURIA: Primary | ICD-10-CM

## 2025-03-04 DIAGNOSIS — N39.0 URINARY TRACT INFECTION WITHOUT HEMATURIA, SITE UNSPECIFIED: ICD-10-CM

## 2025-03-04 DIAGNOSIS — M81.0 OSTEOPOROSIS, UNSPECIFIED OSTEOPOROSIS TYPE, UNSPECIFIED PATHOLOGICAL FRACTURE PRESENCE: ICD-10-CM

## 2025-03-04 DIAGNOSIS — R73.03 PREDIABETES: ICD-10-CM

## 2025-03-04 LAB
POC APPEARANCE, URINE: CLEAR
POC BILIRUBIN, URINE: NEGATIVE
POC BLOOD, URINE: ABNORMAL
POC COLOR, URINE: YELLOW
POC GLUCOSE, URINE: NEGATIVE MG/DL
POC KETONES, URINE: NEGATIVE MG/DL
POC LEUKOCYTES, URINE: NEGATIVE
POC NITRITE,URINE: NEGATIVE
POC PH, URINE: 6.5 PH
POC PROTEIN, URINE: NEGATIVE MG/DL
POC SPECIFIC GRAVITY, URINE: >=1.03
POC UROBILINOGEN, URINE: 0.2 EU/DL

## 2025-03-04 PROCEDURE — 76536 US EXAM OF HEAD AND NECK: CPT | Performed by: INTERNAL MEDICINE

## 2025-03-04 PROCEDURE — 1159F MED LIST DOCD IN RCRD: CPT | Performed by: INTERNAL MEDICINE

## 2025-03-04 PROCEDURE — G2211 COMPLEX E/M VISIT ADD ON: HCPCS | Performed by: INTERNAL MEDICINE

## 2025-03-04 PROCEDURE — 81003 URINALYSIS AUTO W/O SCOPE: CPT | Performed by: INTERNAL MEDICINE

## 2025-03-04 PROCEDURE — 99213 OFFICE O/P EST LOW 20 MIN: CPT | Performed by: INTERNAL MEDICINE

## 2025-03-04 PROCEDURE — 1123F ACP DISCUSS/DSCN MKR DOCD: CPT | Performed by: INTERNAL MEDICINE

## 2025-03-04 PROCEDURE — 1036F TOBACCO NON-USER: CPT | Performed by: INTERNAL MEDICINE

## 2025-03-04 PROCEDURE — 99214 OFFICE O/P EST MOD 30 MIN: CPT | Performed by: INTERNAL MEDICINE

## 2025-03-04 ASSESSMENT — ENCOUNTER SYMPTOMS
FEVER: 0
APPETITE CHANGE: 0
DIAPHORESIS: 0
FREQUENCY: 0
CHILLS: 0
DYSURIA: 0
LOSS OF SENSATION IN FEET: 0
DEPRESSION: 0
FATIGUE: 0
OCCASIONAL FEELINGS OF UNSTEADINESS: 0
FLANK PAIN: 0

## 2025-03-04 NOTE — PROGRESS NOTES
"Subjective   Patient ID: Donna Cadet is a 79 y.o. female who presents for UTI.    HPI In mid Janaury pt had pelvic pressure without any other symptoms and urine culture confirmed e colli and morganella morganii treated with cipro 500 bid 7 days. Unclear if help with resolution of symptoms, second urine culture was obtained on Jan 30 and negative.  She traveled to [place] , she increased hydration, add cranberry suplements, however she still experience intermittent pelvic pressure. No other symptoms for UTI , she denies feeling of prolpase. Her  suggested for her to see Urologist at [facility].   Bowel regimen is unchanged, about once daily with straining, ocassional blood in wiping due to hemorroids.    Review of Systems   Constitutional:  Negative for appetite change, chills, diaphoresis, fatigue and fever.   Genitourinary:  Negative for dysuria, flank pain, frequency, urgency, vaginal bleeding and vaginal discharge.   All other systems reviewed and are negative.      Objective   /60   Pulse 66   Temp 36 °C (96.8 °F)   Resp 16   Ht 1.702 m (5' 7\")   Wt 58.1 kg (128 lb 1.4 oz)   SpO2 98%   BMI 20.06 kg/m²     Physical Exam  Eyes- Conjunctiva clear  Neck-no thyromegaly  Cardiac- regular rate and rhythm, no murmurs  Lung- clear to auscultation  GI- present  bowel sounds, nontender, hypertimpanic,  no rebound no pelvic tendernes, no flank tenderness  MSK- no deformities  Extremities- no edema, good distal pulses  Neuro- non focal, oriented x 3  Psychiatric- pleasant, well groomed, no hallucinations      Assessment/Plan   Problem List Items Addressed This Visit             ICD-10-CM    Microscopic hematuria - Primary R31.29     Initially with UTI, however second time no infection, along with pelvic pressure, consult with urology, pending new urine culture.          Other Visit Diagnoses         Codes    Urinary tract infection without hematuria, site unspecified     N39.0    Relevant Orders    " Urine Culture    POCT UA Automated manually resulted (Completed)

## 2025-03-04 NOTE — PROGRESS NOTES
Patient ID: Donna Cadet is a 79 y.o. female who presents for Follow-up.  HPI  The patient comes in for follow up.    She has a 1.2 cm TI-RADS 4 nodule.    She also has prediabetes osteoporosis hyperlipidemia vitamin D deficiency.    She is post parathyroidectomy in 2008.    She continues to complain of fatigue myalgias and constipation.    Physically she has no other complaints.  She notes no change within her thyroid.    ROS  Comprehensive review of systems is negative.    Objective   Physical Exam  Visit Vitals  /68      Vitals:    03/04/25 0908   Weight: 58.1 kg (128 lb)      Body mass index is 20.05 kg/m².      Weight 128 up 1 pound    Eyes normal  ENT normal. No adenopathy  Thyroid palpable and normal. No nodules  Chest clear to auscultation  Heart sounds are normal  Abdomen nontender. Bowel sounds normal. No organomegaly  Feet are okay    Thyroid ultrasound    Indication  TI-RADS four 1.2 cm nodule    Findings    Right lobe  4.28 x 1.15 x 1.64 cm  With a solid hypoechoic 1.21 x 0.86 x 0.77 cm TI-RADS 4 nodule    Left lobe  3.86 x 1.42 x 1.22 cm with several subcentimeter cystic nodules largest 0.75 cm    Impression  Stable TI-RADS 4 nodule  Current Outpatient Medications   Medication Sig Dispense Refill    calcium carbonate (TUMS ORAL) Take by mouth.      cholecalciferol (Vitamin D-3) 50,000 unit capsule Once a month 12 capsule 1    ciprofloxacin (Cipro) 500 mg tablet Take one tab every 12 hours total 7 days with food 14 tablet 0    MELATONIN ORAL Take by mouth.      polyethylene glycol (Miralax) 17 gram/dose powder Mix 17 g of powder and drink once daily.      simvastatin (Zocor) 10 mg tablet TAKE 1 TABLET (10 MG) BY MOUTH ONCE DAILY AT BEDTIME. 90 tablet 1     Current Facility-Administered Medications   Medication Dose Route Frequency Provider Last Rate Last Admin    denosumab (Prolia) injection 60 mg  60 mg subcutaneous q6 months Alma Delia Mike MD           Assessment/Plan     1.  TI-RADS  four 1.2 cm nodule  2.  Prediabetes  3.  Osteoporosis    Reassured her regarding the structure of her thyroid.    We discussed thyroid function as well as his nonspecificity.    We reviewed her blood work from November.    Encouraged her to work on diet and exercise.    She will follow-up with me in 1 year for repeat ultrasound sooner as needed.

## 2025-03-04 NOTE — PATIENT INSTRUCTIONS
Please call in 48 h for urine results, schedule consult with urologist of your choice soon, if any issues, contact office soon.

## 2025-03-04 NOTE — ASSESSMENT & PLAN NOTE
Initially with UTI, however second time no infection, along with pelvic pressure, consult with urology, pending new urine culture.

## 2025-03-06 LAB — BACTERIA UR CULT: NORMAL

## 2025-03-13 ENCOUNTER — APPOINTMENT (OUTPATIENT)
Dept: OPHTHALMOLOGY | Facility: CLINIC | Age: 80
End: 2025-03-13
Payer: MEDICARE

## 2025-03-13 DIAGNOSIS — H52.7 UNSPECIFIED DISORDER OF REFRACTION: ICD-10-CM

## 2025-03-13 DIAGNOSIS — H16.229 KCS (KERATOCONJUNCTIVITIS SICCA): ICD-10-CM

## 2025-03-13 DIAGNOSIS — H25.813 COMBINED FORMS OF AGE-RELATED CATARACT OF BOTH EYES: Primary | ICD-10-CM

## 2025-03-13 PROCEDURE — 99214 OFFICE O/P EST MOD 30 MIN: CPT | Performed by: OPHTHALMOLOGY

## 2025-03-13 RX ORDER — DENOSUMAB 60 MG/ML
60 INJECTION SUBCUTANEOUS
COMMUNITY

## 2025-03-13 ASSESSMENT — REFRACTION_WEARINGRX
OS_SPHERE: +1.25
OD_SPHERE: +1.25
OD_CYLINDER: -0.75
SPECS_TYPE: BIFOCAL
OS_AXIS: 082
OD_ADD: +2.75
SPECS_TYPE: DISTANCE ONLY
OD_CYLINDER: -0.75
OS_CYLINDER: -0.75
OD_SPHERE: +1.75
OS_CYLINDER: -1.00
OS_AXIS: 088
OD_AXIS: 84.
OS_ADD: +2.75
OS_SPHERE: +1.75
OD_AXIS: 070

## 2025-03-13 ASSESSMENT — REFRACTION_MANIFEST
OD_SPHERE: +1.75
OS_AXIS: 095
OD_AXIS: 090
OS_CYLINDER: -1.50
OS_ADD: +2.75
OD_CYLINDER: -0.75
OD_ADD: +2.75
OD_AXIS: 095
OS_SPHERE: +2.25
OS_SPHERE: +2.00
OS_AXIS: 090
METHOD_AUTOREFRACTION: 1
OD_SPHERE: +1.50
OS_CYLINDER: -1.00
OD_CYLINDER: -0.75

## 2025-03-13 ASSESSMENT — VISUAL ACUITY
OD_CC+: +2
METHOD: SNELLEN - SINGLE
CORRECTION_TYPE: GLASSES
OD_CC: 20/25
OS_CC+: +1
OS_CC: 20/25

## 2025-03-13 ASSESSMENT — ENCOUNTER SYMPTOMS
NEUROLOGICAL NEGATIVE: 0
PSYCHIATRIC NEGATIVE: 0
MUSCULOSKELETAL NEGATIVE: 0
ENDOCRINE NEGATIVE: 0
GASTROINTESTINAL NEGATIVE: 0
RESPIRATORY NEGATIVE: 0
CONSTITUTIONAL NEGATIVE: 0
CARDIOVASCULAR NEGATIVE: 0
HEMATOLOGIC/LYMPHATIC NEGATIVE: 0
EYES NEGATIVE: 0
ALLERGIC/IMMUNOLOGIC NEGATIVE: 0

## 2025-03-13 ASSESSMENT — KERATOMETRY
OS_AXISANGLE_DEGREES: 30
OD_K2POWER_DIOPTERS: 45.75
METHOD_AUTO_MANUAL: AUTOMATED
OD_K1POWER_DIOPTERS: 45.75
OD_AXISANGLE2_DEGREES: 180
OS_AXISANGLE2_DEGREES: 120
OS_K2POWER_DIOPTERS: 47.25
OD_AXISANGLE_DEGREES: 90
OS_K1POWER_DIOPTERS: 46.25

## 2025-03-13 ASSESSMENT — PATIENT HEALTH QUESTIONNAIRE - PHQ9
2. FEELING DOWN, DEPRESSED OR HOPELESS: NOT AT ALL
1. LITTLE INTEREST OR PLEASURE IN DOING THINGS: NOT AT ALL
SUM OF ALL RESPONSES TO PHQ9 QUESTIONS 1 AND 2: 0

## 2025-03-13 ASSESSMENT — TONOMETRY
OS_IOP_MMHG: 14
OD_IOP_MMHG: 14
IOP_METHOD: GOLDMANN APPLANATION

## 2025-03-13 ASSESSMENT — PAIN SCALES - GENERAL: PAINLEVEL_OUTOF10: 0-NO PAIN

## 2025-03-13 ASSESSMENT — EXTERNAL EXAM - LEFT EYE: OS_EXAM: NORMAL

## 2025-03-13 ASSESSMENT — SLIT LAMP EXAM - LIDS
COMMENTS: NORMAL
COMMENTS: NORMAL

## 2025-03-13 ASSESSMENT — EXTERNAL EXAM - RIGHT EYE: OD_EXAM: NORMAL

## 2025-03-13 ASSESSMENT — CUP TO DISC RATIO
OD_RATIO: 0.1
OS_RATIO: 0.1

## 2025-03-13 NOTE — ASSESSMENT & PLAN NOTE
Despite relative lack of symptoms, still appears significantly dry on exam. Advised on role of best lubrication for ocular comfort and vision.

## 2025-03-13 NOTE — PATIENT INSTRUCTIONS
Thank you so much for choosing me to provide your care today!    If you were dilated your vision may remain blurry   or light sensitive for several hours.    The nature of eye and vision problems can require frequent follow up, please make every effort to adhere to any future appointments.    If you have any issues, questions, or concerns,   please do not hesitate to reach out.    If you receive a survey in regards to your care today, please mention any exceptional care my office staff and/or technicians provided.    You can reach our office at this number:    [phone]    Please consider signing up for and utilizing [employer]!  This is the best way to directly reach me or other  providers

## 2025-03-13 NOTE — PROGRESS NOTES
Assessment/Plan   Problem List Items Addressed This Visit       KCS (keratoconjunctivitis sicca)     Despite relative lack of symptoms, still appears significantly dry on exam. Advised on role of best lubrication for ocular comfort and vision.          Combined forms of age-related cataract of both eyes - Primary     Non significant cataract noted on exam. Discussed the natural course of cataract and may require surgery at some point in the future. Will plan to continue to monitor with serial exam.              Unspecified disorder of refraction     Refraction performed today for diagnostic purposes only and without specific intent to dispense Rx. Glasses/SCL Rx not dispensed today.               Provided reassurance regarding above diagnoses and care received in the office visit today. Discussed outcomes and options along with the importance of treatment compliance. Understands the importance of any follow up visits. Patient instructed to call/communicate with our office if any new issues, questions, or concerns.     Will plan to see back in 1 year full or sooner PRN

## 2025-03-13 NOTE — ASSESSMENT & PLAN NOTE
Non significant cataract noted on exam. Discussed the natural course of cataract and may require surgery at some point in the future. Will plan to continue to monitor with serial exam.

## 2025-03-14 ENCOUNTER — HOSPITAL ENCOUNTER (OUTPATIENT)
Dept: RADIOLOGY | Facility: CLINIC | Age: 80
Discharge: HOME | End: 2025-03-14
Payer: MEDICARE

## 2025-03-14 DIAGNOSIS — R31.9 HEMATURIA, UNSPECIFIED: ICD-10-CM

## 2025-03-14 PROCEDURE — 76770 US EXAM ABDO BACK WALL COMP: CPT

## 2025-03-14 PROCEDURE — 76770 US EXAM ABDO BACK WALL COMP: CPT | Performed by: STUDENT IN AN ORGANIZED HEALTH CARE EDUCATION/TRAINING PROGRAM

## 2025-03-26 ENCOUNTER — APPOINTMENT (OUTPATIENT)
Dept: RADIOLOGY | Facility: CLINIC | Age: 80
End: 2025-03-26
Payer: MEDICARE

## 2025-03-29 DIAGNOSIS — E78.5 HYPERLIPIDEMIA, UNSPECIFIED: ICD-10-CM

## 2025-03-31 RX ORDER — SIMVASTATIN 10 MG/1
10 TABLET, FILM COATED ORAL NIGHTLY
Qty: 90 TABLET | Refills: 0 | Status: SHIPPED | OUTPATIENT
Start: 2025-03-31

## 2025-04-02 ENCOUNTER — HOSPITAL ENCOUNTER (OUTPATIENT)
Dept: RADIOLOGY | Facility: CLINIC | Age: 80
Discharge: HOME | End: 2025-04-02
Payer: MEDICARE

## 2025-04-02 DIAGNOSIS — R39.14 FEELING OF INCOMPLETE BLADDER EMPTYING: ICD-10-CM

## 2025-04-02 PROCEDURE — 74178 CT ABD&PLV WO CNTR FLWD CNTR: CPT

## 2025-04-02 PROCEDURE — 2550000001 HC RX 255 CONTRASTS: Performed by: ORTHOPAEDIC SURGERY

## 2025-04-02 RX ADMIN — IOHEXOL 75 ML: 350 INJECTION, SOLUTION INTRAVENOUS at 15:39

## 2025-04-09 ENCOUNTER — TELEPHONE (OUTPATIENT)
Dept: PRIMARY CARE | Facility: CLINIC | Age: 80
End: 2025-04-09
Payer: MEDICARE

## 2025-04-22 ENCOUNTER — TELEPHONE (OUTPATIENT)
Dept: PRIMARY CARE | Facility: CLINIC | Age: 80
End: 2025-04-22

## 2025-04-22 NOTE — TELEPHONE ENCOUNTER
Pt called to say she had surgery to remove her kidney stone also states she will call to speak with you about the ct of the her longs she is unsure if she wants to get it done

## 2025-04-26 LAB
25(OH)D3+25(OH)D2 SERPL-MCNC: 44 NG/ML (ref 30–100)
ALBUMIN SERPL-MCNC: 4.2 G/DL (ref 3.6–5.1)
ALP SERPL-CCNC: 42 U/L (ref 37–153)
ALT SERPL-CCNC: 24 U/L (ref 6–29)
ANION GAP SERPL CALCULATED.4IONS-SCNC: 7 MMOL/L (CALC) (ref 7–17)
APPEARANCE UR: CLEAR
AST SERPL-CCNC: 20 U/L (ref 10–35)
BACTERIA #/AREA URNS HPF: ABNORMAL /HPF
BACTERIA UR CULT: ABNORMAL
BASOPHILS # BLD AUTO: 41 CELLS/UL (ref 0–200)
BASOPHILS NFR BLD AUTO: 0.6 %
BILIRUB SERPL-MCNC: 0.7 MG/DL (ref 0.2–1.2)
BILIRUB UR QL STRIP: NEGATIVE
BUN SERPL-MCNC: 23 MG/DL (ref 7–25)
CALCIUM SERPL-MCNC: 9.3 MG/DL (ref 8.6–10.4)
CHLORIDE SERPL-SCNC: 108 MMOL/L (ref 98–110)
CK SERPL-CCNC: 128 U/L (ref 18–225)
CO2 SERPL-SCNC: 27 MMOL/L (ref 20–32)
COLLAGEN CTX SERPL-MCNC: NORMAL PG/ML
COLOR UR: YELLOW
CREAT SERPL-MCNC: 0.87 MG/DL (ref 0.6–1)
EGFRCR SERPLBLD CKD-EPI 2021: 68 ML/MIN/1.73M2
EOSINOPHIL # BLD AUTO: 90 CELLS/UL (ref 15–500)
EOSINOPHIL NFR BLD AUTO: 1.3 %
ERYTHROCYTE [DISTWIDTH] IN BLOOD BY AUTOMATED COUNT: 11.4 % (ref 11–15)
GLUCOSE SERPL-MCNC: 86 MG/DL (ref 65–99)
GLUCOSE UR QL STRIP: NEGATIVE
HCT VFR BLD AUTO: 37 % (ref 35–45)
HGB BLD-MCNC: 12.1 G/DL (ref 11.7–15.5)
HGB UR QL STRIP: NEGATIVE
HYALINE CASTS #/AREA URNS LPF: ABNORMAL /LPF
KETONES UR QL STRIP: NEGATIVE
LEUKOCYTE ESTERASE UR QL STRIP: ABNORMAL
LYMPHOCYTES # BLD AUTO: 1166 CELLS/UL (ref 850–3900)
LYMPHOCYTES NFR BLD AUTO: 16.9 %
MAGNESIUM SERPL-MCNC: 2.1 MG/DL (ref 1.5–2.5)
MCH RBC QN AUTO: 31.1 PG (ref 27–33)
MCHC RBC AUTO-ENTMCNC: 32.7 G/DL (ref 32–36)
MCV RBC AUTO: 95.1 FL (ref 80–100)
MONOCYTES # BLD AUTO: 704 CELLS/UL (ref 200–950)
MONOCYTES NFR BLD AUTO: 10.2 %
NEUTROPHILS # BLD AUTO: 4899 CELLS/UL (ref 1500–7800)
NEUTROPHILS NFR BLD AUTO: 71 %
NITRITE UR QL STRIP: NEGATIVE
PH UR STRIP: 7 [PH] (ref 5–8)
PHOSPHATE SERPL-MCNC: 3.1 MG/DL (ref 2.1–4.3)
PLATELET # BLD AUTO: 262 THOUSAND/UL (ref 140–400)
PMV BLD REES-ECKER: 11.3 FL (ref 7.5–12.5)
POTASSIUM SERPL-SCNC: 4.6 MMOL/L (ref 3.5–5.3)
PROT SERPL-MCNC: 6.5 G/DL (ref 6.1–8.1)
PROT UR QL STRIP: ABNORMAL
PTH-INTACT SERPL-MCNC: 70 PG/ML (ref 16–77)
RBC # BLD AUTO: 3.89 MILLION/UL (ref 3.8–5.1)
RBC #/AREA URNS HPF: ABNORMAL /HPF
SERVICE CMNT-IMP: ABNORMAL
SODIUM SERPL-SCNC: 142 MMOL/L (ref 135–146)
SP GR UR STRIP: 1.02 (ref 1–1.03)
SQUAMOUS #/AREA URNS HPF: ABNORMAL /HPF
WBC # BLD AUTO: 6.9 THOUSAND/UL (ref 3.8–10.8)
WBC #/AREA URNS HPF: ABNORMAL /HPF

## 2025-04-28 LAB
25(OH)D3+25(OH)D2 SERPL-MCNC: 44 NG/ML (ref 30–100)
ALBUMIN SERPL-MCNC: 4.2 G/DL (ref 3.6–5.1)
ALP SERPL-CCNC: 42 U/L (ref 37–153)
ALT SERPL-CCNC: 24 U/L (ref 6–29)
ANION GAP SERPL CALCULATED.4IONS-SCNC: 7 MMOL/L (CALC) (ref 7–17)
AST SERPL-CCNC: 20 U/L (ref 10–35)
BASOPHILS # BLD AUTO: 41 CELLS/UL (ref 0–200)
BASOPHILS NFR BLD AUTO: 0.6 %
BILIRUB SERPL-MCNC: 0.7 MG/DL (ref 0.2–1.2)
BUN SERPL-MCNC: 23 MG/DL (ref 7–25)
CALCIUM SERPL-MCNC: 9.3 MG/DL (ref 8.6–10.4)
CHLORIDE SERPL-SCNC: 108 MMOL/L (ref 98–110)
CK SERPL-CCNC: 128 U/L (ref 18–225)
CO2 SERPL-SCNC: 27 MMOL/L (ref 20–32)
COLLAGEN CTX SERPL-MCNC: 91 PG/ML
CREAT SERPL-MCNC: 0.87 MG/DL (ref 0.6–1)
EGFRCR SERPLBLD CKD-EPI 2021: 68 ML/MIN/1.73M2
EOSINOPHIL # BLD AUTO: 90 CELLS/UL (ref 15–500)
EOSINOPHIL NFR BLD AUTO: 1.3 %
ERYTHROCYTE [DISTWIDTH] IN BLOOD BY AUTOMATED COUNT: 11.4 % (ref 11–15)
GLUCOSE SERPL-MCNC: 86 MG/DL (ref 65–99)
HCT VFR BLD AUTO: 37 % (ref 35–45)
HGB BLD-MCNC: 12.1 G/DL (ref 11.7–15.5)
LYMPHOCYTES # BLD AUTO: 1166 CELLS/UL (ref 850–3900)
LYMPHOCYTES NFR BLD AUTO: 16.9 %
MAGNESIUM SERPL-MCNC: 2.1 MG/DL (ref 1.5–2.5)
MCH RBC QN AUTO: 31.1 PG (ref 27–33)
MCHC RBC AUTO-ENTMCNC: 32.7 G/DL (ref 32–36)
MCV RBC AUTO: 95.1 FL (ref 80–100)
MONOCYTES # BLD AUTO: 704 CELLS/UL (ref 200–950)
MONOCYTES NFR BLD AUTO: 10.2 %
NEUTROPHILS # BLD AUTO: 4899 CELLS/UL (ref 1500–7800)
NEUTROPHILS NFR BLD AUTO: 71 %
PHOSPHATE SERPL-MCNC: 3.1 MG/DL (ref 2.1–4.3)
PLATELET # BLD AUTO: 262 THOUSAND/UL (ref 140–400)
PMV BLD REES-ECKER: 11.3 FL (ref 7.5–12.5)
POTASSIUM SERPL-SCNC: 4.6 MMOL/L (ref 3.5–5.3)
PROT SERPL-MCNC: 6.5 G/DL (ref 6.1–8.1)
PTH-INTACT SERPL-MCNC: 70 PG/ML (ref 16–77)
RBC # BLD AUTO: 3.89 MILLION/UL (ref 3.8–5.1)
SODIUM SERPL-SCNC: 142 MMOL/L (ref 135–146)
WBC # BLD AUTO: 6.9 THOUSAND/UL (ref 3.8–10.8)

## 2025-05-07 ENCOUNTER — OFFICE VISIT (OUTPATIENT)
Dept: RHEUMATOLOGY | Facility: CLINIC | Age: 80
End: 2025-05-07
Payer: MEDICARE

## 2025-05-07 VITALS
OXYGEN SATURATION: 99 % | TEMPERATURE: 97 F | HEART RATE: 70 BPM | SYSTOLIC BLOOD PRESSURE: 117 MMHG | DIASTOLIC BLOOD PRESSURE: 71 MMHG

## 2025-05-07 DIAGNOSIS — Z79.899 ENCOUNTER FOR LONG-TERM (CURRENT) USE OF MEDICATIONS: ICD-10-CM

## 2025-05-07 DIAGNOSIS — E55.9 VITAMIN D DEFICIENCY: ICD-10-CM

## 2025-05-07 DIAGNOSIS — M81.0 OSTEOPOROSIS WITHOUT CURRENT PATHOLOGICAL FRACTURE, UNSPECIFIED OSTEOPOROSIS TYPE: Primary | ICD-10-CM

## 2025-05-07 DIAGNOSIS — D47.2 MGUS (MONOCLONAL GAMMOPATHY OF UNKNOWN SIGNIFICANCE): ICD-10-CM

## 2025-05-07 PROCEDURE — 1126F AMNT PAIN NOTED NONE PRSNT: CPT | Performed by: INTERNAL MEDICINE

## 2025-05-07 PROCEDURE — 1159F MED LIST DOCD IN RCRD: CPT | Performed by: INTERNAL MEDICINE

## 2025-05-07 PROCEDURE — 99213 OFFICE O/P EST LOW 20 MIN: CPT | Performed by: INTERNAL MEDICINE

## 2025-05-07 PROCEDURE — 1160F RVW MEDS BY RX/DR IN RCRD: CPT | Performed by: INTERNAL MEDICINE

## 2025-05-07 ASSESSMENT — ROUTINE ASSESSMENT OF PATIENT INDEX DATA (RAPID3)
WALK_KILOMETERS: WITHOUT ANY DIFFICULTY
ON A SCALE OF ONE TO TEN, HOW MUCH PAIN HAVE YOU HAD BECAUSE OF YOUR CONDITION OVER THE PAST WEEK?: 0
GOOD_NIGHTS_SLEEP: WITH SOME DIFFICULTY
PARTIPATE_RECREATIONAL_ACTIVITIES: WITHOUT ANY DIFFICULTY
DRESS_YOURSELF: WITHOUT ANY DIFFICULTY
TOTAL RAPID3 SCORE: 2.5
WASH_DRY_BODY: WITHOUT ANY DIFFICULTY
WEIGHTED_TOTAL_SCORE: 0.83
IN_OUT_TRANSPORT: WITHOUT ANY DIFFICULTY
FN_SCORE: 0
PICK_CLOTHES_OFF_FLOOR: WITHOUT ANY DIFFICULTY
ON A SCALE OF ONE TO TEN, CONSIDERING ALL THE WAYS IN WHICH ILLNESS AND HEALTH CONDITIONS MAY AFFECT YOU AT THIS TIME, PLEASE INDICATE BELOW HOW YOU ARE DOING:: 2.5
TURN_FAUCETS_OFF: WITHOUT ANY DIFFICULTY
FEELINGS_ANXIETY_NERVOUS: WITHOUT ANY DIFFICULTY
ON A SCALE OF ONE TO TEN, CONSIDERING ALL THE WAYS IN WHICH ILLNESS AND HEALTH CONDITIONS MAY AFFECT YOU AT THIS TIME, PLEASE INDICATE BELOW HOW YOU ARE DOING:: 2.5
FEELINGS_DEPRESSION: WITHOUT ANY DIFFICULTY
WALK_FLAT_GROUND: WITHOUT ANY DIFFICULTY
SEVERITY_SCORE: 0
LIFT_CUP_TO_MOUTH: WITHOUT ANY DIFFICULTY
ON A SCALE OF ONE TO TEN, HOW MUCH PAIN HAVE YOU HAD BECAUSE OF YOUR CONDITION OVER THE PAST WEEK?: 0
SEVERITY_SCORE: NEAR REMISSION (NR)
IN_OUT_BED: WITHOUT ANY DIFFICULTY
SUM OF QUESTIONS A TO J: 0

## 2025-05-07 ASSESSMENT — PAIN SCALES - GENERAL: PAINLEVEL_OUTOF10: 0-NO PAIN

## 2025-05-07 ASSESSMENT — COLUMBIA-SUICIDE SEVERITY RATING SCALE - C-SSRS
6. HAVE YOU EVER DONE ANYTHING, STARTED TO DO ANYTHING, OR PREPARED TO DO ANYTHING TO END YOUR LIFE?: NO
1. IN THE PAST MONTH, HAVE YOU WISHED YOU WERE DEAD OR WISHED YOU COULD GO TO SLEEP AND NOT WAKE UP?: NO
2. HAVE YOU ACTUALLY HAD ANY THOUGHTS OF KILLING YOURSELF?: NO

## 2025-05-07 NOTE — PROGRESS NOTES
[facility]/  Rheumatology  [facility], [address]  [place] [address]  Phone: [phone]  Fax: [phone]    Rheumatology Follow Up Visit 05/07/2025      Referred by: Dr García for   Chief Complaint   Patient presents with    Follow-up       Donna Cadet is a 79 y.o. female here for continuing osteoporosis tx with Prolia.     Last Visit: 6/1/24    HPI  77 y/o female with hx of   Hyperlipidemia, prediabetes, osteoporosis without know fragility fractures, MGUS, C-spine surgery, lumbar fusion, hyperPTH, s/p PTH gland resection 2008, thyroid nodules.  Previous pt of Dr García for osteoporosis.  Has done well with Prolia and is due her injection  and will be getting today.   Denies any jaw, joint or bone pain, fractures or falls.   On calcium and vit D    ROS + dry eyes- also noted by Optha and advised to use lubricating eye drops, non significant sandhya cataracts    Rheum Hx  Prolia      Health Maintenance:  DXA T -2.9 (hip; 1/22) -3.2 (forearm); FRAX 19%/7.6%  Malignancy Hx- none  Immunization History   Administered Date(s) Administered    Flu vaccine, quadrivalent, high-dose, preservative free, age 65y+ (FLUZONE) 10/26/2020, 10/19/2022    Flu vaccine, trivalent, preservative free, HIGH-DOSE, age 65y+ (Fluzone) 11/29/2017, 10/16/2018, 10/01/2019, 09/25/2020    Influenza, Unspecified 09/22/2009, 09/02/2011, 10/31/2021    Influenza, seasonal, injectable 12/09/2016    Moderna SARS-CoV-2 Vaccination 01/25/2021, 02/22/2021, 11/06/2021    Pfizer COVID-19 vaccine, bivalent, age 12 years and older (30 mcg/0.3 mL) 12/09/2022    Pneumococcal conjugate vaccine, 13-valent (PREVNAR 13) 10/13/2015    RESPIRATORY SYNCYTIAL VIRUS (RSV), ELIGIBLE PREGNANT PTS, 0.5 ML (ABRYSVO) 12/04/2023    Tdap vaccine, age 7 year and older (BOOSTRIX, ADACEL) 09/02/2011, 07/28/2021    Zoster vaccine, recombinant, adult (SHINGRIX) 10/01/2020    Zoster, Unspecified 10/01/2020          Past Medical History:    Diagnosis Date    Age-related nuclear cataract of both eyes     Age-related osteoporosis without current pathological fracture 07/10/2013    Osteoporosis    Dry eye syndrome of bilateral lacrimal glands     History of hyperparathyroidism     s/p resection    MGUS (monoclonal gammopathy of unknown significance)     Multiple thyroid nodules     Other conditions influencing health status     Bone Density Studies Dual-Energy X-ray Absorptiometry    Other hemorrhoids 10/03/2017    Bleeding internal hemorrhoids    Other specified disorders of temporomandibular joint 08/03/2017    TMJ inflammation    Pyuria 03/25/2014    Pyuria    Unspecified disorder of refraction       Past Surgical History:   Procedure Laterality Date    COLONOSCOPY  04/20/2013    Complete Colonoscopy    LUMBAR FUSION  01/05/2017    Lumbar Vertebral Fusion    OTHER SURGICAL HISTORY  04/20/2013    Parathyroid Complete Parathyroidectomy    OTHER SURGICAL HISTORY  05/10/2018    Vertebral Body Resection Cervical Segment      Current Outpatient Medications   Medication Sig Dispense Refill    calcium carbonate (TUMS ORAL) Take by mouth.      cholecalciferol (Vitamin D-3) 50,000 unit capsule Once a month 12 capsule 1    denosumab (Prolia) 60 mg/mL syringe Inject 1 mL (60 mg total) under the skin every 6 months.      MELATONIN ORAL Take by mouth.      polyethylene glycol (Miralax) 17 gram/dose powder Mix 17 g of powder and drink once daily.      simvastatin (Zocor) 10 mg tablet TAKE 1 TABLET (10 MG) BY MOUTH ONCE DAILY AT BEDTIME. 90 tablet 0     No current facility-administered medications for this visit.      Allergies   Allergen Reactions    Melatonin Other and GI Upset      Visit Vitals  /71 (BP Location: Left arm, Patient Position: Sitting, BP Cuff Size: Adult)   Pulse 70   Temp 36.1 °C (97 °F) (Temporal)   SpO2 99%   Smoking Status Never            Rapid 3  Function Score (FN): 0  Pain Score (PN) (0-10): 0  Patient Global (PTGL) (0-10):  2.5  Rapid3 Score: 2.5  RAPID3 Weighted Score: 0.83    Component      Latest Ref Rng 11/5/2024   WBC      4.4 - 11.3 x10*3/uL 4.5    nRBC      0.0 - 0.0 /100 WBCs 0.0    RBC      4.00 - 5.20 x10*6/uL 4.07    HEMOGLOBIN      12.0 - 16.0 g/dL 12.3    HEMATOCRIT      36.0 - 46.0 % 39.9    MCV      80 - 100 fL 98    MCH      26.0 - 34.0 pg 30.2    MCHC      32.0 - 36.0 g/dL 30.8 (L)    RED CELL DISTRIBUTION WIDTH      11.5 - 14.5 % 12.8    Platelets      150 - 450 x10*3/uL 232    Neutrophils %      40.0 - 80.0 % 66.4    Immature Granulocytes %, Automated      0.0 - 0.9 % 0.2    Lymphocytes %      13.0 - 44.0 % 21.1    Monocytes %      2.0 - 10.0 % 8.3    Eosinophils %      0.0 - 6.0 % 3.6    Basophils %      0.0 - 2.0 % 0.4    Neutrophils Absolute      1.60 - 5.50 x10*3/uL 2.95    Immature Granulocytes Absolute, Automated      0.00 - 0.50 x10*3/uL 0.01    Lymphocytes Absolute      0.80 - 3.00 x10*3/uL 0.94    Monocytes Absolute      0.05 - 0.80 x10*3/uL 0.37    Eosinophils Absolute      0.00 - 0.40 x10*3/uL 0.16    Basophils Absolute      0.00 - 0.10 x10*3/uL 0.02    GLUCOSE      74 - 99 mg/dL 154 (H)    SODIUM      136 - 145 mmol/L 143    POTASSIUM      3.5 - 5.3 mmol/L 4.1    CHLORIDE      98 - 107 mmol/L 104    Bicarbonate      21 - 32 mmol/L 30    Anion Gap      10 - 20 mmol/L 13    Blood Urea Nitrogen      6 - 23 mg/dL 25 (H)    Creatinine      0.50 - 1.05 mg/dL 0.97    EGFR      >60 mL/min/1.73m*2 60 (L)    Calcium      8.6 - 10.6 mg/dL 9.4    Albumin      3.4 - 5.0 g/dL 4.1    Alkaline Phosphatase      33 - 136 U/L 47    Total Protein      6.4 - 8.2 g/dL 6.4    AST      9 - 39 U/L 14    Bilirubin Total      0.0 - 1.2 mg/dL 0.5    ALT      7 - 45 U/L 16    Color, Urine      Light-Yellow, Yellow, Dark-Yellow  Light-Yellow    Appearance, Urine      Clear  Clear    Specific Gravity, Urine      1.005 - 1.035  1.023    pH, Urine      5.0, 5.5, 6.0, 6.5, 7.0, 7.5, 8.0  5.5    Protein, Urine      NEGATIVE, 10 (TRACE), 20  (TRACE) mg/dL 10 (TRACE)    Glucose, Urine      Normal mg/dL Normal    Blood, Urine      NEGATIVE  0.03 (TRACE) !    Ketones, Urine      NEGATIVE mg/dL NEGATIVE    Bilirubin, Urine      NEGATIVE  NEGATIVE    Urobilinogen, Urine      Normal mg/dL Normal    Nitrite, Urine      NEGATIVE  NEGATIVE    Leukocyte Esterase, Urine      NEGATIVE  250 Urmila/µL !    WBC, Urine      1-5, NONE /HPF 21-50 !    RBC, Urine      NONE, 1-2, 3-5 /HPF 6-10 !    Squamous Epithelial Cells, Urine      Reference range not established. /HPF 1-9 (SPARSE)    Mucus, Urine      Reference range not established. /LPF 2+    Albumin, Urine Random      Not established mg/L 70.2    Creatinine, Urine Random      20.0 - 320.0 mg/dL 125.8    Albumin/Creatinine Ratio      <30.0 ug/mg Creat 55.8 (H)    C-Reactive Protein      <1.00 mg/dL 0.17    Creatine Kinase      0 - 215 U/L 64    Parathyroid Hormone, Intact      18.5 - 88.0 pg/mL 63.9    PHOSPHORUS      2.5 - 4.9 mg/dL 3.9    MAGNESIUM      1.60 - 2.40 mg/dL 1.98    Vitamin D, 25-Hydroxy, Total      30 - 100 ng/mL 47    Thyroid Stimulating Hormone      0.44 - 3.98 mIU/L 2.63    Anti-SSB      <1.0 AI <0.2    Anti-SSA      <1.0 AI <0.2    Extra Tube Hold for add-ons.       Component      Latest Ref [facility] 4/25/2025   WHITE BLOOD CELL COUNT      3.8 - 10.8 Thousand/uL 6.9    RED BLOOD CELL COUNT      3.80 - 5.10 Million/uL 3.89    HEMOGLOBIN      11.7 - 15.5 g/dL 12.1    HEMATOCRIT      35.0 - 45.0 % 37.0    MCV      80.0 - 100.0 fL 95.1    MCH      27.0 - 33.0 pg 31.1    MCHC      32.0 - 36.0 g/dL 32.7    RDW      11.0 - 15.0 % 11.4    PLATELET COUNT      140 - 400 Thousand/uL 262    MPV      7.5 - 12.5 fL 11.3    ABSOLUTE NEUTROPHILS      1,500 - 7,800 cells/uL 4,899    ABSOLUTE LYMPHOCYTES      850 - 3,900 cells/uL 1,166    ABSOLUTE MONOCYTES      200 - 950 cells/uL 704    ABSOLUTE EOSINOPHILS      15 - 500 cells/uL 90    ABSOLUTE BASOPHILS      0 - 200 cells/uL 41    NEUTROPHILS      % 71    LYMPHOCYTES       % 16.9    MONOCYTES      % 10.2    EOSINOPHILS      % 1.3    BASOPHILS      % 0.6    COLOR      YELLOW  YELLOW    APPEARANCE      CLEAR  CLEAR    SPECIFIC GRAVITY      1.001 - 1.035  1.020    PH      5.0 - 8.0  7.0    GLUCOSE      NEGATIVE  NEGATIVE    BILIRUBIN      NEGATIVE  NEGATIVE    KETONES      NEGATIVE  NEGATIVE    OCCULT BLOOD      NEGATIVE  NEGATIVE    PROTEIN      NEGATIVE  TRACE !    NITRITE      NEGATIVE  NEGATIVE    LEUKOCYTE ESTERASE      NEGATIVE  1+ !    WBC      < OR = 5 /HPF 0-5    RBC      < OR = 2 /HPF 0-2    SQUAMOUS EPITHELIAL CELLS      < OR = 5 /HPF NONE SEEN    BACTERIA      NONE SEEN /HPF NONE SEEN    HYALINE CAST      NONE SEEN /LPF NONE SEEN    NOTE --    CULTURE, URINE, ROUTINE --    GLUCOSE      65 - 99 mg/dL 86    UREA NITROGEN (BUN)      7 - 25 mg/dL 23    CREATININE      0.60 - 1.00 mg/dL 0.87    EGFR      > OR = 60 mL/min/1.73m2 68    SODIUM      135 - 146 mmol/L 142    POTASSIUM      3.5 - 5.3 mmol/L 4.6    CHLORIDE      98 - 110 mmol/L 108    CARBON DIOXIDE      20 - 32 mmol/L 27    ELECTROLYTE BALANCE      7 - 17 mmol/L (calc) 7    CALCIUM      8.6 - 10.4 mg/dL 9.3    PROTEIN, TOTAL      6.1 - 8.1 g/dL 6.5    ALBUMIN      3.6 - 5.1 g/dL 4.2    BILIRUBIN, TOTAL      0.2 - 1.2 mg/dL 0.7    ALKALINE PHOSPHATASE      37 - 153 U/L 42    AST      10 - 35 U/L 20    ALT      6 - 29 U/L 24    MAGNESIUM      1.5 - 2.5 mg/dL 2.1    PHOSPHATE (AS PHOSPHORUS)      2.1 - 4.3 mg/dL 3.1    CREATINE KINASE, TOTAL      18 - 225 U/L 128    C TELOPEPTIDE (CTX)      see note pg/mL 91    PARATHYROID HORMONE, INTACT      16 - 77 pg/mL 70    VITAMIN D,25-OH,TOTAL,IA      30 - 100 ng/mL 44         No diagnosis found.         Since last appt, adherent and tolerating Prolia-  Last Prolia 5/5/24  Taking calcium and vit D.  Active  Denies any recent or current infection.  Not on any NSAIDs or glucocorticoids.  ROS+ for joint pain- right hip comes and goes; TTP over greater trochanter, no hip tenderness  with rotation  Labs and DXA reviewed  D/w pt tx options and decided on   Continue Prolia- doing well  Exercises/stretches and supportive care for trochanteric bursitis.  Heel strikes, balance exercises, and low weight bearing activity recommended.   Recommend calcium 1200 mg daily, preferably from diet, and vit D at least 1000 units daily.   Advised of possible side effects and importance of monitoring.   All questions answered.  Patient to follow up with primary care provider regarding all other medical issues not addressed today and for medical chart updating.     Alma Delia Mike MD      Patient Care Team:  Karla Justice MD as PCP - General (Internal Medicine)  Karla Justice MD as PCP [facility]ributed [facility]  Alma Delia Mike MD as Consulting Physician (Rheumatology)

## 2025-05-08 ENCOUNTER — APPOINTMENT (OUTPATIENT)
Dept: RHEUMATOLOGY | Facility: CLINIC | Age: 80
End: 2025-05-08
Payer: MEDICARE

## 2025-05-09 ENCOUNTER — APPOINTMENT (OUTPATIENT)
Dept: RHEUMATOLOGY | Facility: CLINIC | Age: 80
End: 2025-05-09
Payer: MEDICARE

## 2025-05-21 ENCOUNTER — LAB (OUTPATIENT)
Dept: LAB | Facility: HOSPITAL | Age: 80
End: 2025-05-21
Payer: MEDICARE

## 2025-05-21 DIAGNOSIS — D47.2 MONOCLONAL GAMMOPATHY: Primary | ICD-10-CM

## 2025-05-21 DIAGNOSIS — D47.2 MGUS (MONOCLONAL GAMMOPATHY OF UNKNOWN SIGNIFICANCE): ICD-10-CM

## 2025-05-21 LAB
ALBUMIN SERPL BCP-MCNC: 4.3 G/DL (ref 3.4–5)
ALP SERPL-CCNC: 46 U/L (ref 33–136)
ALT SERPL W P-5'-P-CCNC: 16 U/L (ref 7–45)
ANION GAP SERPL CALC-SCNC: 8 MMOL/L (ref 10–20)
AST SERPL W P-5'-P-CCNC: 18 U/L (ref 9–39)
BASOPHILS # BLD AUTO: 0.03 X10*3/UL (ref 0–0.1)
BASOPHILS NFR BLD AUTO: 0.5 %
BILIRUB SERPL-MCNC: 0.7 MG/DL (ref 0–1.2)
BUN SERPL-MCNC: 28 MG/DL (ref 6–23)
CALCIUM SERPL-MCNC: 10.1 MG/DL (ref 8.6–10.6)
CHLORIDE SERPL-SCNC: 103 MMOL/L (ref 98–107)
CO2 SERPL-SCNC: 36 MMOL/L (ref 21–32)
CREAT SERPL-MCNC: 1.01 MG/DL (ref 0.5–1.05)
EGFRCR SERPLBLD CKD-EPI 2021: 57 ML/MIN/1.73M*2
EOSINOPHIL # BLD AUTO: 0.12 X10*3/UL (ref 0–0.4)
EOSINOPHIL NFR BLD AUTO: 2.2 %
ERYTHROCYTE [DISTWIDTH] IN BLOOD BY AUTOMATED COUNT: 12.5 % (ref 11.5–14.5)
GLUCOSE SERPL-MCNC: 105 MG/DL (ref 74–99)
HCT VFR BLD AUTO: 40.7 % (ref 36–46)
HGB BLD-MCNC: 12.4 G/DL (ref 12–16)
IGA SERPL-MCNC: 207 MG/DL (ref 70–400)
IGG SERPL-MCNC: 839 MG/DL (ref 700–1600)
IGM SERPL-MCNC: 185 MG/DL (ref 40–230)
IMM GRANULOCYTES # BLD AUTO: 0.02 X10*3/UL (ref 0–0.5)
IMM GRANULOCYTES NFR BLD AUTO: 0.4 % (ref 0–0.9)
LYMPHOCYTES # BLD AUTO: 1.27 X10*3/UL (ref 0.8–3)
LYMPHOCYTES NFR BLD AUTO: 23 %
MCH RBC QN AUTO: 30 PG (ref 26–34)
MCHC RBC AUTO-ENTMCNC: 30.5 G/DL (ref 32–36)
MCV RBC AUTO: 99 FL (ref 80–100)
MONOCYTES # BLD AUTO: 0.62 X10*3/UL (ref 0.05–0.8)
MONOCYTES NFR BLD AUTO: 11.2 %
NEUTROPHILS # BLD AUTO: 3.47 X10*3/UL (ref 1.6–5.5)
NEUTROPHILS NFR BLD AUTO: 62.7 %
NRBC BLD-RTO: 0 /100 WBCS (ref 0–0)
PLATELET # BLD AUTO: 222 X10*3/UL (ref 150–450)
POTASSIUM SERPL-SCNC: 4.9 MMOL/L (ref 3.5–5.3)
PROT SERPL-MCNC: 6.9 G/DL (ref 6.4–8.2)
PROT SERPL-MCNC: 6.9 G/DL (ref 6.4–8.2)
RBC # BLD AUTO: 4.13 X10*6/UL (ref 4–5.2)
SODIUM SERPL-SCNC: 142 MMOL/L (ref 136–145)
WBC # BLD AUTO: 5.5 X10*3/UL (ref 4.4–11.3)

## 2025-05-21 PROCEDURE — 85025 COMPLETE CBC W/AUTO DIFF WBC: CPT

## 2025-05-21 PROCEDURE — 84155 ASSAY OF PROTEIN SERUM: CPT

## 2025-05-21 PROCEDURE — 36415 COLL VENOUS BLD VENIPUNCTURE: CPT

## 2025-05-21 PROCEDURE — 83521 IG LIGHT CHAINS FREE EACH: CPT

## 2025-05-21 PROCEDURE — 80053 COMPREHEN METABOLIC PANEL: CPT

## 2025-05-21 PROCEDURE — 82784 ASSAY IGA/IGD/IGG/IGM EACH: CPT

## 2025-05-22 LAB
KAPPA LC SERPL-MCNC: 2.64 MG/DL (ref 0.33–1.94)
KAPPA LC/LAMBDA SER: 1.57 {RATIO} (ref 0.26–1.65)
LAMBDA LC SERPL-MCNC: 1.68 MG/DL (ref 0.57–2.63)

## 2025-05-28 LAB
ALBUMIN: 4.2 G/DL (ref 3.4–5)
ALPHA 1 GLOBULIN: 0.3 G/DL (ref 0.2–0.6)
ALPHA 2 GLOBULIN: 0.7 G/DL (ref 0.4–1.1)
BETA GLOBULIN: 0.9 G/DL (ref 0.5–1.2)
GAMMA GLOBULIN: 0.9 G/DL (ref 0.5–1.4)
IMMUNOFIXATION COMMENT: ABNORMAL
M-PROTEIN 1: 0.1 G/DL (ref ?–0)
PATH REVIEW - SERUM IMMUNOFIXATION: ABNORMAL
PATH REVIEW-SERUM PROTEIN ELECTROPHORESIS: ABNORMAL
PROTEIN ELECTROPHORESIS COMMENT: ABNORMAL

## 2025-06-02 ENCOUNTER — OFFICE VISIT (OUTPATIENT)
Dept: HEMATOLOGY/ONCOLOGY | Facility: CLINIC | Age: 80
End: 2025-06-02
Payer: MEDICARE

## 2025-06-02 VITALS
BODY MASS INDEX: 20.06 KG/M2 | OXYGEN SATURATION: 93 % | TEMPERATURE: 97.2 F | RESPIRATION RATE: 18 BRPM | SYSTOLIC BLOOD PRESSURE: 124 MMHG | DIASTOLIC BLOOD PRESSURE: 77 MMHG | HEART RATE: 72 BPM | WEIGHT: 128.1 LBS

## 2025-06-02 DIAGNOSIS — D47.2 MGUS (MONOCLONAL GAMMOPATHY OF UNKNOWN SIGNIFICANCE): Primary | ICD-10-CM

## 2025-06-02 PROCEDURE — 1126F AMNT PAIN NOTED NONE PRSNT: CPT

## 2025-06-02 PROCEDURE — 1159F MED LIST DOCD IN RCRD: CPT

## 2025-06-02 PROCEDURE — 99215 OFFICE O/P EST HI 40 MIN: CPT

## 2025-06-02 ASSESSMENT — ENCOUNTER SYMPTOMS
CONSTITUTIONAL NEGATIVE: 1
HEMATOLOGIC/LYMPHATIC NEGATIVE: 1
RESPIRATORY NEGATIVE: 1
CARDIOVASCULAR NEGATIVE: 1
EYES NEGATIVE: 1
NEUROLOGICAL NEGATIVE: 1
ENDOCRINE NEGATIVE: 1
MUSCULOSKELETAL NEGATIVE: 1
GASTROINTESTINAL NEGATIVE: 1
PSYCHIATRIC NEGATIVE: 1

## 2025-06-02 ASSESSMENT — PAIN SCALES - GENERAL: PAINLEVEL_OUTOF10: 0-NO PAIN

## 2025-06-02 NOTE — PROGRESS NOTES
Patient ID: Donna Cadet is a 79 y.o. female.    Initial MGUS Workup 5/1/2018   - SPEP: IgM Lambda 0.2g/dl  - SFLC: K 2.3, L 1.52, R 1.51  - Immunoglobulins: Unremarkable  - UPEP: Pending    -S: N/A  -Li: K 2.3, L 1.52, R 1.51  -M: N/A  -C: 10.1  -R: SrCr 1.01  -A: Hgb 12.4  -B: N/A  SUBJECTIVE   Donna presents for annual follow up for MGUS. PMH includes anemia, constipation, chronic cervical neck pain and lumbar back pain, dyslipidemia, neuropathy,  osteoarthritis of the hip, and osteoporosis. Has had an IgM Lambda M protein detected since 2009. Formerly followed by Alhaji Dimas CNP    Review of Systems   Constitutional: Negative.    HENT:  Negative.     Eyes: Negative.    Respiratory: Negative.     Cardiovascular: Negative.    Gastrointestinal: Negative.    Endocrine: Negative.    Genitourinary: Negative.     Musculoskeletal: Negative.    Skin: Negative.    Neurological: Negative.    Hematological: Negative.    Psychiatric/Behavioral: Negative.         OBJECTIVE      BSA: 1.66 meters squared  /77   Pulse 72   Temp 36.2 °C (97.2 °F)   Resp 18   Wt 58.1 kg (128 lb 1.6 oz)   SpO2 93%   BMI 20.06 kg/m²     Physical Exam  Constitutional:       Appearance: Normal appearance.   Musculoskeletal:         General: Normal range of motion.   Skin:     General: Skin is warm and dry.   Neurological:      General: No focal deficit present.      Mental Status: She is alert and oriented to person, place, and time.   Psychiatric:         Mood and Affect: Mood normal.         Behavior: Behavior normal.         Thought Content: Thought content normal.         Performance Status:  Karnofsky Score: 100 - Fully active, able to carry on all pre-disease performed without restriction    MONITORING: MYELOMA LAB MARKERS  MARKERS RECENT LAB VALUES   Free Kappa Light Chains Lab Results   Component Value Date    KAPPA 2.64 (H) 05/21/2025    KAPPA 2.30 (H) 05/22/2024      Free Lambda Light Chains Lab Results   Component Value Date     LAMBDA 1.68 05/21/2025    LAMBDA 1.52 05/22/2024      Free Light Chain Ratio Lab Results   Component Value Date    KAPLS 1.57 05/21/2025    KAPLS 1.51 05/22/2024       Immunofixation Lab Results   Component Value Date    IEPIN  05/21/2025 5/21/25 Known monoclonal IgM lambda in the gamma region at 0.1 g/dL. Last detected on 5/22/24 at 0.2 g/dL.    IEPIN  05/22/2024 5/22/24  Known monoclonal IgM lambda in the gamma region at 0.2 g/dL.  Unchanged from the previous analysis on 5/24/23.    IEPIN ABNORMAL 05/24/2023    IEPIN ABNORMAL 06/20/2022    IEPIN ABNORMAL 05/01/2018      IgG Lab Results   Component Value Date     05/21/2025     05/22/2024      IgA Lab Results   Component Value Date     05/21/2025     05/22/2024      IgM Lab Results   Component Value Date     05/21/2025     05/22/2024        Other Labs  Lab Results   Component Value Date    WBC 5.5 05/21/2025    NRBC 0.0 05/21/2025    RBC 4.13 05/21/2025    HGB 12.4 05/21/2025    HCT 40.7 05/21/2025    MCV 99 05/21/2025    MCH 30.0 05/21/2025    MCHC 30.5 (L) 05/21/2025    RDW 12.5 05/21/2025     05/21/2025    IGPCT 0.4 05/21/2025    LYMPHOPCT 23.0 05/21/2025    MONOPCT 11.2 05/21/2025    EOSPCT 2.2 05/21/2025    BASOPCT 0.5 05/21/2025    NEUTROABS 3.47 05/21/2025    MONOSABS 0.62 05/21/2025    EOSABS 0.12 05/21/2025       Lab Results   Component Value Date    GLUCOSE 105 (H) 05/21/2025     05/21/2025    K 4.9 05/21/2025     05/21/2025    CO2 36 (H) 05/21/2025    ANIONGAP 8 (L) 05/21/2025    BUN 28 (H) 05/21/2025    CREATININE 1.01 05/21/2025    EGFR 57 (L) 05/21/2025    CALCIUM 10.1 05/21/2025    ALBUMIN 4.3 05/21/2025    ALKPHOS 46 05/21/2025    PROT 6.9 05/21/2025    PROT 6.9 05/21/2025    AST 18 05/21/2025    BILITOT 0.7 05/21/2025    ALT 16 05/21/2025       Lab Results   Component Value Date     05/22/2024       ASSESSMENT & PLAN   IgM Lambda MGUS  - M spike 0.2g/dl  - no SLiM CRAB  criteria   - no B s/sx   - RTC 1 year     Michelle Winters, JOSE MARIA-CNP

## 2025-06-28 DIAGNOSIS — E78.5 HYPERLIPIDEMIA, UNSPECIFIED: ICD-10-CM

## 2025-06-30 RX ORDER — SIMVASTATIN 10 MG/1
10 TABLET, FILM COATED ORAL NIGHTLY
Qty: 90 TABLET | Refills: 0 | Status: SHIPPED | OUTPATIENT
Start: 2025-06-30

## 2025-07-08 ENCOUNTER — APPOINTMENT (OUTPATIENT)
Dept: PRIMARY CARE | Facility: CLINIC | Age: 80
End: 2025-07-08
Payer: MEDICARE

## 2025-08-01 ENCOUNTER — APPOINTMENT (OUTPATIENT)
Dept: PRIMARY CARE | Facility: CLINIC | Age: 80
End: 2025-08-01
Payer: MEDICARE

## 2025-08-01 VITALS
HEART RATE: 69 BPM | DIASTOLIC BLOOD PRESSURE: 72 MMHG | OXYGEN SATURATION: 99 % | SYSTOLIC BLOOD PRESSURE: 120 MMHG | BODY MASS INDEX: 19.34 KG/M2 | WEIGHT: 123.46 LBS

## 2025-08-01 DIAGNOSIS — R91.1 LUNG NODULE: ICD-10-CM

## 2025-08-01 DIAGNOSIS — Z00.00 ROUTINE GENERAL MEDICAL EXAMINATION AT HEALTH CARE FACILITY: Primary | ICD-10-CM

## 2025-08-01 DIAGNOSIS — Z00.00 WELLNESS EXAMINATION: ICD-10-CM

## 2025-08-01 DIAGNOSIS — R73.9 HYPERGLYCEMIA: ICD-10-CM

## 2025-08-01 DIAGNOSIS — E55.9 VITAMIN D DEFICIENCY: ICD-10-CM

## 2025-08-01 DIAGNOSIS — Z12.31 VISIT FOR SCREENING MAMMOGRAM: ICD-10-CM

## 2025-08-01 DIAGNOSIS — E78.00 PURE HYPERCHOLESTEROLEMIA: ICD-10-CM

## 2025-08-01 DIAGNOSIS — F51.01 PRIMARY INSOMNIA: ICD-10-CM

## 2025-08-01 PROCEDURE — 1160F RVW MEDS BY RX/DR IN RCRD: CPT | Performed by: INTERNAL MEDICINE

## 2025-08-01 PROCEDURE — 1159F MED LIST DOCD IN RCRD: CPT | Performed by: INTERNAL MEDICINE

## 2025-08-01 PROCEDURE — 99214 OFFICE O/P EST MOD 30 MIN: CPT | Performed by: INTERNAL MEDICINE

## 2025-08-01 PROCEDURE — 1036F TOBACCO NON-USER: CPT | Performed by: INTERNAL MEDICINE

## 2025-08-01 PROCEDURE — 1170F FXNL STATUS ASSESSED: CPT | Performed by: INTERNAL MEDICINE

## 2025-08-01 PROCEDURE — G0439 PPPS, SUBSEQ VISIT: HCPCS | Performed by: INTERNAL MEDICINE

## 2025-08-01 RX ORDER — ASPIRIN 325 MG
TABLET, DELAYED RELEASE (ENTERIC COATED) ORAL
Qty: 12 CAPSULE | Refills: 1 | Status: SHIPPED | OUTPATIENT
Start: 2025-08-01

## 2025-08-01 RX ORDER — QUETIAPINE FUMARATE 25 MG/1
TABLET, FILM COATED ORAL
Qty: 60 TABLET | Refills: 1 | Status: SHIPPED | OUTPATIENT
Start: 2025-08-01

## 2025-08-01 ASSESSMENT — ENCOUNTER SYMPTOMS
TREMORS: 0
ABDOMINAL DISTENTION: 0
LOSS OF SENSATION IN FEET: 0
OCCASIONAL FEELINGS OF UNSTEADINESS: 1
DEPRESSION: 1
HEADACHES: 0
DIFFICULTY URINATING: 0
ABDOMINAL PAIN: 0
PALPITATIONS: 0
CHEST TIGHTNESS: 0

## 2025-08-01 ASSESSMENT — ACTIVITIES OF DAILY LIVING (ADL)
DRESSING: INDEPENDENT
BATHING: INDEPENDENT
GROCERY_SHOPPING: INDEPENDENT
DOING_HOUSEWORK: INDEPENDENT
TAKING_MEDICATION: INDEPENDENT
MANAGING_FINANCES: INDEPENDENT

## 2025-08-01 ASSESSMENT — PATIENT HEALTH QUESTIONNAIRE - PHQ9
1. LITTLE INTEREST OR PLEASURE IN DOING THINGS: NOT AT ALL
2. FEELING DOWN, DEPRESSED OR HOPELESS: NOT AT ALL
SUM OF ALL RESPONSES TO PHQ9 QUESTIONS 1 AND 2: 0
1. LITTLE INTEREST OR PLEASURE IN DOING THINGS: NOT AT ALL
2. FEELING DOWN, DEPRESSED OR HOPELESS: NOT AT ALL
SUM OF ALL RESPONSES TO PHQ9 QUESTIONS 1 AND 2: 0

## 2025-08-01 NOTE — ASSESSMENT & PLAN NOTE
Continue moonthly supply to reduce numbner of meds. Pt unable to swallow meds , her last vitamin d level wnl  Orders:    cholecalciferol (Vitamin D-3) 1.25 mg (50,000 units) capsule; Once a month

## 2025-08-01 NOTE — PROGRESS NOTES
Subjective   Reason for Visit: Donna Cadet is an 80 y.o. female here for a Medicare Wellness visit.     Past Medical, Surgical, and Family History reviewed and updated in chart.    Reviewed all medications by prescribing practitioner or clinical pharmacist (such as prescriptions, OTCs, herbal therapies and supplements) and documented in the medical record.    HPI  She is sad and grieving lost of her daughter in law who  of breast cancer. Another son in finantial troubles.  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  Pt completed lithotripsy in April, still left ureter stones present in ct of 2025    Patient Care Team:  Karla Justice MD as PCP - General (Internal Medicine)  Karla Justice MD as PCP - [facility]  Alma Delia Mike MD as Consulting Physician (Rheumatology)     Review of Systems   HENT:          Chronic trouble swolling large tablets and some times have regurgitation   Respiratory:  Negative for chest tightness.    Cardiovascular:  Negative for palpitations and leg swelling.   Gastrointestinal:  Negative for abdominal distention and abdominal pain.        She uses miralax few times a week , with hard stool very 1 or 2 days   Genitourinary:  Negative for difficulty urinating and urgency.   Neurological:  Negative for tremors and headaches.   All other systems reviewed and are negative.      Objective   Vitals:  /72   Pulse 69   Wt 56 kg (123 lb 7.3 oz)   SpO2 99%   BMI 19.34 kg/m²       Physical Exam  Eyes - conjunctiva clear, PERRLA  HEENT - no impacted wax,  Neck - No cervical lymphadenopathy, no thyromegaly, scar from prior surgeries  Axilla - no palpable lymphadenopathy  Breast - visual exam: no asymmetry; palpation: nontender, no palpable nodules,  retractions or discharge bilaterally  Cardiac - regular rate and rhythm, no murmurs, no carotid bruit, no JVP  Lung- clear to auscultation, no rales, no rhonchi, no wheezing  GI- normally active bowel sounds, nontender, nondistended, no hepatosplenomegaly, no rebound  MSK - no deformities  Neuro - non focal, oriented x 3  Extremities - no edema, good distal arterial pulses  Skin - no rash  Psychiatric - pleasant, cooperative, no hallucinations    Assessment & Plan  Vitamin D deficiency  Continue moonthly supply to reduce numbner of meds. Pt unable to swallow meds , her last vitamin d level wnl  Orders:    cholecalciferol (Vitamin D-3) 1.25 mg (50,000 units) capsule; Once a month    Routine general medical examination at health care facility    Orders:    1 Year Follow Up In Primary Care - Wellness Exam; Future    Wellness examination  Discussed diet, exercise, immunizations, and screening appropriate for their age.  Colonoscopy: no longer   Dexa: may 2024 on prolia, for over 4 years  Mammogram: Oct 2024  PAP:no longer         Visit for screening mammogram    Orders:    BI mammo bilateral screening tomosynthesis; Future    Pure hypercholesterolemia  Pending to adjust her statin  Orders:    Lipid panel; Future    Primary insomnia  Recently worsen due to emotional stress, discuss for certain period trial of meds and sleep higiene  Orders:    QUEtiapine (SEROquel) 25 mg tablet; Take 1/2 to 1 tablet 1 hour prior to bed time PRN    Lung nodule  Needs follow up after ct abdomen pelvis, find lung changes as incidental finding  Orders:    CT chest wo IV contrast; Future    Hyperglycemia  Re eval with fasting , since in the past elevated unclear fasting or not  Orders:    Glucose, fasting; Future    Hemoglobin A1c; Future

## 2025-08-01 NOTE — PROGRESS NOTES
Subjective   Reason for Visit: Donna Cadet is an 80 y.o. female here for a Medicare Wellness visit.          Reviewed all medications by prescribing practitioner or clinical pharmacist (such as prescriptions, OTCs, herbal therapies and supplements) and documented in the medical record.    HPI    Patient Care Team:  Karla Justice MD as PCP - General (Internal Medicine)  Karla Justice MD as PCP - [facility] Attributed Provider  Alma Delia Mike MD as Consulting Physician (Rheumatology)     Review of Systems    Objective   Vitals:  /72   Pulse 69   Wt 56 kg (123 lb 7.3 oz)   SpO2 99%   BMI 19.34 kg/m²       Physical Exam    Assessment & Plan  Vitamin D deficiency         Routine general medical examination at health care facility    Orders:  •  1 Year Follow Up In Primary Care - Wellness Exam; Future

## 2025-08-01 NOTE — PATIENT INSTRUCTIONS
Drink plenty during the day but decrease to only necessary fluids after 6 pm. If you drink alcohol, don't use it close to bed time. Avoid media, phone, TV close to bed time. Use white noise and dark room to sleep. Start new medication 1/2 tablet queatipine close to bed time , give few days for response, may increase if needed. Return fastin for labs. Complete mammogram and ct chest. Discuss w urlogist next steps . Return in 3 months

## 2025-08-01 NOTE — ASSESSMENT & PLAN NOTE
Discussed diet, exercise, immunizations, and screening appropriate for their age.  Colonoscopy: no longer   Dexa: may 2024 on prolia, for over 4 years  Mammogram: Oct 2024  PAP:no longer

## 2025-08-01 NOTE — ASSESSMENT & PLAN NOTE
Recently worsen due to emotional stress, discuss for certain period trial of meds and sleep higiene  Orders:    QUEtiapine (SEROquel) 25 mg tablet; Take 1/2 to 1 tablet 1 hour prior to bed time PRN

## 2025-08-01 NOTE — ASSESSMENT & PLAN NOTE
Re eval with fasting , since in the past elevated unclear fasting or not  Orders:    Glucose, fasting; Future    Hemoglobin A1c; Future

## 2025-08-06 LAB
CHOLEST SERPL-MCNC: 147 MG/DL
CHOLEST/HDLC SERPL: 2.2 (CALC)
EST. AVERAGE GLUCOSE BLD GHB EST-MCNC: 134 MG/DL
EST. AVERAGE GLUCOSE BLD GHB EST-SCNC: 7.4 MMOL/L
GLUCOSE P FAST SERPL-MCNC: 101 MG/DL (ref 65–99)
HBA1C MFR BLD: 6.3 %
HDLC SERPL-MCNC: 68 MG/DL
LDLC SERPL CALC-MCNC: 64 MG/DL (CALC)
NONHDLC SERPL-MCNC: 79 MG/DL (CALC)
TRIGL SERPL-MCNC: 74 MG/DL

## 2025-11-04 ENCOUNTER — APPOINTMENT (OUTPATIENT)
Dept: PRIMARY CARE | Facility: CLINIC | Age: 80
End: 2025-11-04
Payer: MEDICARE

## 2026-03-03 ENCOUNTER — APPOINTMENT (OUTPATIENT)
Dept: ENDOCRINOLOGY | Facility: CLINIC | Age: 81
End: 2026-03-03
Payer: MEDICARE

## 2026-03-19 ENCOUNTER — APPOINTMENT (OUTPATIENT)
Dept: OPHTHALMOLOGY | Facility: CLINIC | Age: 81
End: 2026-03-19
Payer: MEDICARE

## 2026-08-03 ENCOUNTER — APPOINTMENT (OUTPATIENT)
Dept: PRIMARY CARE | Facility: CLINIC | Age: 81
End: 2026-08-03
Payer: MEDICARE

## 2026-08-04 ENCOUNTER — APPOINTMENT (OUTPATIENT)
Dept: PRIMARY CARE | Facility: CLINIC | Age: 81
End: 2026-08-04
Payer: MEDICARE